# Patient Record
Sex: FEMALE | Race: WHITE | NOT HISPANIC OR LATINO | Employment: UNEMPLOYED | URBAN - METROPOLITAN AREA
[De-identification: names, ages, dates, MRNs, and addresses within clinical notes are randomized per-mention and may not be internally consistent; named-entity substitution may affect disease eponyms.]

---

## 2017-02-21 ENCOUNTER — OFFICE VISIT (OUTPATIENT)
Dept: NEUROLOGY | Facility: CLINIC | Age: 62
End: 2017-02-21
Payer: COMMERCIAL

## 2017-02-21 VITALS
BODY MASS INDEX: 52.44 KG/M2 | SYSTOLIC BLOOD PRESSURE: 138 MMHG | DIASTOLIC BLOOD PRESSURE: 72 MMHG | HEIGHT: 61 IN | WEIGHT: 277.75 LBS | HEART RATE: 76 BPM

## 2017-02-21 DIAGNOSIS — G25.81 RLS (RESTLESS LEGS SYNDROME): ICD-10-CM

## 2017-02-21 DIAGNOSIS — G62.9 NEUROPATHY: Primary | ICD-10-CM

## 2017-02-21 DIAGNOSIS — M79.7 FIBROMYALGIA: ICD-10-CM

## 2017-02-21 DIAGNOSIS — G31.84 MILD NEUROCOGNITIVE DISORDER: ICD-10-CM

## 2017-02-21 DIAGNOSIS — M51.9 LUMBAR DISC DISORDER: ICD-10-CM

## 2017-02-21 PROCEDURE — 99214 OFFICE O/P EST MOD 30 MIN: CPT | Mod: S$GLB,,, | Performed by: PSYCHIATRY & NEUROLOGY

## 2017-02-21 PROCEDURE — 1160F RVW MEDS BY RX/DR IN RCRD: CPT | Mod: S$GLB,,, | Performed by: PSYCHIATRY & NEUROLOGY

## 2017-02-21 PROCEDURE — 99999 PR PBB SHADOW E&M-EST. PATIENT-LVL III: CPT | Mod: PBBFAC,,, | Performed by: PSYCHIATRY & NEUROLOGY

## 2017-02-21 NOTE — MR AVS SNAPSHOT
[facility]  [address] [address]  Phone: [phone]  Fax: [phone]                  Pepper Dow   2017 2:45 PM   Office Visit    Description:  Female : 1955   Provider:  Henrique Wood MD   Department:  [facility]           Reason for Visit     Peripheral Neuropathy           Diagnoses this Visit        Comments    Neuropathy    -  Primary     Mild neurocognitive disorder         Lumbar disc disorder         Fibromyalgia         RLS (restless legs syndrome)                To Do List           Goals (5 Years of Data)     None      Follow-Up and Disposition     Return in about 6 months (around 2017).      [facility] On Call     [facility] On Call Nurse Care Line -  Assistance  Registered nurses in the [facility] On Call Center provide clinical advisement, health education, appointment booking, and other advisory services.  Call for this free service at [phone].             Medications           Message regarding Medications     Verify the changes and/or additions to your medication regime listed below are the same as discussed with your clinician today.  If any of these changes or additions are incorrect, please notify your healthcare provider.             Verify that the below list of medications is an accurate representation of the medications you are currently taking.  If none reported, the list may be blank. If incorrect, please contact your healthcare provider. Carry this list with you in case of emergency.           Current Medications     albuterol (ACCUNEB) 0.63 mg/3 mL Nebu Take 0.63 mg by nebulization every 6 (six) hours as needed.      allopurinol (ZYLOPRIM) 100 MG tablet Take 100 mg by mouth once daily.     aspirin (ECOTRIN) 81 MG EC tablet Take 81 mg by mouth once daily.      budesonide-formoterol 160-4.5 mcg (SYMBICORT) 160-4.5 mcg/actuation HFAA Inhale 2 puffs into the lungs every 12 (twelve) hours.     "bumetanide (BUMEX) 2 MG tablet Take 1 mg by mouth once daily.     cetirizine (ZYRTEC) 10 MG tablet Take 10 mg by mouth once daily.    cyclobenzaprine (FLEXERIL) 10 MG tablet Take 10 mg by mouth every evening.    dapagliflozin 5 mg Tab Take 5 mg by mouth once daily.    desonide (DESOWEN) 0.05 % cream Apply topically 2 (two) times daily.    gabapentin (NEURONTIN) 400 MG capsule Take 800 mg by mouth 3 (three) times daily.    hydrocodone-acetaminophen 5-325mg (NORCO) 5-325 mg per tablet Take 1 tablet by mouth every 6 (six) hours as needed for Pain.    hydrocortisone (WESTCORT) 0.2 % cream Apply topically 2 (two) times daily.    ketoconazole (NIZORAL) 2 % cream Apply topically 2 (two) times daily.    levmefolate-B6 phos-methyl-B12 (METANX) 3-35-2 mg Tab Take 1 tablet by mouth 2 (two) times daily.      levothyroxine (SYNTHROID) 100 MCG tablet Take 100 mcg by mouth once daily.    metformin (FORTAMET) 1,000 mg 24 hr tablet Take 1,000 mg by mouth 2 (two) times daily with meals.      montelukast (SINGULAIR) 10 mg tablet Take 10 mg by mouth every evening.    olmesartan (BENICAR) 20 MG tablet Take 20 mg by mouth once daily.     pramipexole (MIRAPEX) 0.125 MG tablet Take 2 po qhs    SITagliptan (JANUVIA) 100 MG Tab Take 100 mg by mouth once daily.    duloxetine (CYMBALTA) 30 MG capsule Take 1 capsule (30 mg total) by mouth once daily.           Clinical Reference Information           Your Vitals Were     BP Pulse Height Weight BMI    138/72 76 5' 1" (1.549 m) 126 kg (277 lb 12.5 oz) 52.49 kg/m2      Blood Pressure          Most Recent Value    BP  138/72      Allergies as of 2/21/2017     No Known Allergies      Immunizations Administered on Date of Encounter - 2/21/2017     None      Language Assistance Services     ATTENTION: Language assistance services are available, free of charge. Please call [phone].      ATENCIÓN: Si habla bethanie, tiene a weir disposición servicios gratuitos de asistencia lingüística. Llame al " [phone].     MAYDA Ý: N?u b?n nói Ti?ng Vi?t, có các d?ch v? h? tr? ngôn ng? mi?n phí dành cho b?n. G?i s? [phone].         [facility] complies with applicable Federal civil rights laws and does not discriminate on the basis of race, color, national origin, age, disability, or sex.

## 2017-02-21 NOTE — PROGRESS NOTES
HPI:  Pepper Dow is a 61 y.o. female  with DM,  lower back pain (focal) likely from  Annular tear at L5/S1 in addition to multiple level degenerative change of the L spine per 2012 MRI. 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 MRI brain 2015 unremarkable and no epileptiform discharges on EEG at that time.   Patient states she has been doing much better- walking better and her pain is much better controlled with PT. Cymbalta prescribed by pain management works well.  Her memory is improved as well. She did have mild neurocognitive disorder on memory testing  Mood disorder noted as well. She states her mood is ok- some financial stressors.   Tremor is very rare  RLS is variable as well/ overall well controlled.   Review of Systems   Constitutional: Negative for fever.   HENT: Negative for nosebleeds.    Eyes: Negative for double vision.   Respiratory: Negative for hemoptysis.    Cardiovascular: Negative for leg swelling.   Gastrointestinal: Negative for blood in stool.   Genitourinary: Negative for hematuria.   Musculoskeletal: Negative for falls.   Skin: Negative for rash.   Neurological: Negative for seizures.   Endo/Heme/Allergies: Does not bruise/bleed easily.   Psychiatric/Behavioral: The patient does not have insomnia.        Exam:  Gen Appearance, well developed/nourished in no apparent distress  CV: 2+ distal pulses with mild dependent edema or swelling  Neuro:  MS: Awake, alert, Sustains attention  Recent/remote memory intact, Language is full to spontaneous speech/comprehension. Fund of Knowledge is full  No longer with stuttering  CN: , PERRL, Extraoccular movements and visual fields are full. Normal facial sensation and strength, Hearing symmetric, Tongue and Palate are midline and strong. Shoulder Shrug  symmetric and strong.  She is fully awake and alter throughout the exam.   Motor: Normal bulk, tone reduced in the intrinsic hand and feet muscles, no abnormal movements at rest but there is mild tremor with outstretched hands. 5/5 strength bilateral upper/lower extremities with 2+ reflexes in the arms and 1+ in the knees, absent ankle jerks.   Sensory: symmetric to vibration and temp. Romberg negative  Cerebellar: Finger-nose,Rapid alternating movements intact  Gait: Normal stance, no ataxia, but arthralgic gait    2015 Labs: Mild TSh elevation was treated by PCP-- labs now followed by PCP  TSH normal       2015 MRI and CT brain: some atrophy     Assessment/Plan: Pepper Dow is a 61 y.o. female  with DM,  lower back pain (focal) likely from  Annular tear at L5/S1 in addition to multiple level degenerative change of the L spine per 2012 MRI. 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 MRI brain 2015 unremarkable and no epileptiform discharges on EEG at that time.     I recommend:   1. 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   2. Her neuropathy could be challenging her balance and gait--she is much better with PT prescribed by pain management and pain is improved with Cymbalta per pain management.   3. Check Neuropsychological testing: Mild neurocognitive disorder by 2016 Neuropsychological testing as well as anxiety depression (mood seems improved on Cymbalta). Reviewed the importance of good physical and mental activity for memory reviewed.    4. Also continue Metanx and Gabapentin for  neuropathy. She failed Lyrica prior. No longer on Elavil and neuropathic cream did not help. The importance of good DM control needed to prevent worsening neuropathy reviewed.  Continue Weight loss  needed again reviewed today  5. Patient has been diagnosed with fibromyalgia (instead of RA).   6. She will continue Mirapex for RLS-- She has also had a few years of tremor with possible family history of this--tremor seems most consistent with benign essential tremor and is not frequently bothersome  7. Lumbar pain improved after Lumbar pain    RTC in 6 months

## 2017-03-29 ENCOUNTER — TELEPHONE (OUTPATIENT)
Dept: NEUROLOGY | Facility: CLINIC | Age: 62
End: 2017-03-29

## 2017-03-29 NOTE — TELEPHONE ENCOUNTER
----- Message from Aurora Ramirez sent at 3/29/2017 11:45 AM CDT -----  Contact: self  Pepper Dow  MRN: [id number]  : 1955  PCP: Ricardo Springer  Home Phone      [phone]  Work Phone      Not on file.  Mobile          [phone]      MESSAGE: the patient states the pramipexole (MIRAPEX) 0.125 MG tablet is not helping with the RLS at this time. She currently takes 2 tablets at night and is requesting if the medication can be increased.  Phone:[phone]

## 2017-03-30 ENCOUNTER — HOSPITAL ENCOUNTER (OUTPATIENT)
Dept: RADIOLOGY | Facility: HOSPITAL | Age: 62
Discharge: HOME | End: 2017-03-30
Attending: NURSE PRACTITIONER
Payer: COMMERCIAL

## 2017-03-30 ENCOUNTER — OFFICE VISIT (OUTPATIENT)
Dept: NEUROLOGY | Facility: CLINIC | Age: 62
End: 2017-03-30
Payer: COMMERCIAL

## 2017-03-30 VITALS
BODY MASS INDEX: 53.77 KG/M2 | HEIGHT: 61 IN | RESPIRATION RATE: 18 BRPM | DIASTOLIC BLOOD PRESSURE: 70 MMHG | WEIGHT: 284.81 LBS | SYSTOLIC BLOOD PRESSURE: 132 MMHG | HEART RATE: 66 BPM

## 2017-03-30 DIAGNOSIS — R26.9 GAIT ABNORMALITY: ICD-10-CM

## 2017-03-30 DIAGNOSIS — M25.512 ACUTE PAIN OF LEFT SHOULDER: ICD-10-CM

## 2017-03-30 DIAGNOSIS — R42 DIZZINESS: ICD-10-CM

## 2017-03-30 DIAGNOSIS — R41.0 CONFUSION: ICD-10-CM

## 2017-03-30 DIAGNOSIS — R47.9 SPEECH DISTURBANCE, UNSPECIFIED TYPE: ICD-10-CM

## 2017-03-30 DIAGNOSIS — G25.81 RLS (RESTLESS LEGS SYNDROME): Primary | ICD-10-CM

## 2017-03-30 DIAGNOSIS — M54.2 NECK PAIN ON LEFT SIDE: ICD-10-CM

## 2017-03-30 PROCEDURE — 73030 X-RAY EXAM OF SHOULDER: CPT | Mod: TC,LT

## 2017-03-30 PROCEDURE — 72040 X-RAY EXAM NECK SPINE 2-3 VW: CPT | Mod: TC

## 2017-03-30 PROCEDURE — 72040 X-RAY EXAM NECK SPINE 2-3 VW: CPT | Mod: 26,,, | Performed by: RADIOLOGY

## 2017-03-30 PROCEDURE — 99999 PR PBB SHADOW E&M-EST. PATIENT-LVL V: CPT | Mod: PBBFAC,,, | Performed by: NURSE PRACTITIONER

## 2017-03-30 PROCEDURE — 99215 OFFICE O/P EST HI 40 MIN: CPT | Mod: S$GLB,,, | Performed by: NURSE PRACTITIONER

## 2017-03-30 PROCEDURE — 73030 X-RAY EXAM OF SHOULDER: CPT | Mod: 26,LT,, | Performed by: RADIOLOGY

## 2017-03-30 PROCEDURE — 1160F RVW MEDS BY RX/DR IN RCRD: CPT | Mod: S$GLB,,, | Performed by: NURSE PRACTITIONER

## 2017-03-30 RX ORDER — PRAMIPEXOLE DIHYDROCHLORIDE 0.75 MG/1
TABLET ORAL
Qty: 30 TABLET | Refills: 5 | Status: SHIPPED | OUTPATIENT
Start: 2017-03-30 | End: 2017-04-13 | Stop reason: SDUPTHER

## 2017-03-30 NOTE — MR AVS SNAPSHOT
[facility]  [address] [facility] [address]  Phone: [phone]  Fax: [phone]                  Pepper Dow   3/30/2017 2:20 PM   Office Visit    Description:  Female : 1955   Provider:  Mallory Ramirez NP   Department:  [facility]           Reason for Visit     Neurologic Problem     Headache           Diagnoses this Visit        Comments    RLS (restless legs syndrome)    -  Primary     Confusion         Speech disturbance, unspecified type         Acute pain of left shoulder         Neck pain on left side         Dizziness         Gait abnormality                To Do List           Future Appointments        Provider Department Dept Phone    3/30/2017 3:50 PM LAB, [facility] [phone]    3/30/2017 4:00 PM [facility] XR2 [facility] [phone]    2017 1:45 PM [facility] MRI1 [facility] [phone]    2017 10:30 AM Henrique Wood MD [facility] [phone]      Goals (5 Years of Data)     None      Follow-Up and Disposition     Return in about 2 weeks (around 2017).       These Medications        Disp Refills Start End    pramipexole (MIRAPEX) 0.75 MG tablet 30 tablet 5 3/30/2017     Take 2 po qhs    Pharmacy: [address] [address] [facility] [address] Ph #: [phone]         [facility]     [facility] Nurse Care Line -  Assistance  Unless otherwise directed by your provider, please contact [facility], our nurse care line that is available for  assistance.     Registered nurses in the [facility] Center provide: appointment scheduling, clinical advisement, health education, and other advisory services.  Call: [phone] (toll free)               Medications           Message regarding Medications     Verify the changes and/or additions to your medication regime listed below are the same  as discussed with your clinician today.  If any of these changes or additions are incorrect, please notify your healthcare provider.        CHANGE how you are taking these medications     Start Taking Instead of    pramipexole (MIRAPEX) 0.75 MG tablet pramipexole (MIRAPEX) 0.125 MG tablet    Dosage:  Take 2 po qhs Dosage:  Take 2 po qhs    Reason for Change:  Reorder            Verify that the below list of medications is an accurate representation of the medications you are currently taking.  If none reported, the list may be blank. If incorrect, please contact your healthcare provider. Carry this list with you in case of emergency.           Current Medications     albuterol (ACCUNEB) 0.63 mg/3 mL Nebu Take 0.63 mg by nebulization every 6 (six) hours as needed.      allopurinol (ZYLOPRIM) 100 MG tablet Take 100 mg by mouth once daily.     aspirin (ECOTRIN) 81 MG EC tablet Take 81 mg by mouth once daily.      budesonide-formoterol 160-4.5 mcg (SYMBICORT) 160-4.5 mcg/actuation HFAA Inhale 2 puffs into the lungs every 12 (twelve) hours.    bumetanide (BUMEX) 2 MG tablet Take 1 mg by mouth once daily.     cetirizine (ZYRTEC) 10 MG tablet Take 10 mg by mouth once daily.    cyclobenzaprine (FLEXERIL) 10 MG tablet Take 10 mg by mouth every evening.    dapagliflozin 5 mg Tab Take 5 mg by mouth once daily.    desonide (DESOWEN) 0.05 % cream Apply topically 2 (two) times daily.    duloxetine (CYMBALTA) 30 MG capsule Take 1 capsule (30 mg total) by mouth once daily.    gabapentin (NEURONTIN) 400 MG capsule Take 800 mg by mouth 3 (three) times daily.    hydrocodone-acetaminophen 5-325mg (NORCO) 5-325 mg per tablet Take 1 tablet by mouth every 6 (six) hours as needed for Pain.    hydrocortisone (WESTCORT) 0.2 % cream Apply topically 2 (two) times daily.    ketoconazole (NIZORAL) 2 % cream Apply topically 2 (two) times daily.    levmefolate-B6 phos-methyl-B12 (METANX) 3-35-2 mg Tab Take 1 tablet by mouth 2 (two) times daily.    "   levothyroxine (SYNTHROID) 100 MCG tablet Take 100 mcg by mouth once daily.    metformin (FORTAMET) 1,000 mg 24 hr tablet Take 1,000 mg by mouth 2 (two) times daily with meals.      montelukast (SINGULAIR) 10 mg tablet Take 10 mg by mouth every evening.    olmesartan (BENICAR) 20 MG tablet Take 20 mg by mouth once daily.     pramipexole (MIRAPEX) 0.75 MG tablet Take 2 po qhs    SITagliptan (JANUVIA) 100 MG Tab Take 100 mg by mouth once daily.           Clinical Reference Information           Your Vitals Were     BP Pulse Resp Height Weight BMI    132/70 (BP Location: Right arm, Patient Position: Sitting, BP Method: Manual) 66 18 5' 1" (1.549 m) 129.2 kg (284 lb 13.4 oz) 53.82 kg/m2      Blood Pressure          Most Recent Value    BP  132/70      Allergies as of 3/30/2017     No Known Allergies      Immunizations Administered on Date of Encounter - 3/30/2017     None      Orders Placed During Today's Visit      Normal Orders This Visit    WALKER FOR HOME USE     Future Labs/Procedures Expected by Expires    CBC auto differential  3/30/2017 5/29/2018    Comprehensive metabolic panel  3/30/2017 3/30/2018    Ferritin  3/30/2017 5/29/2018    IRON AND TIBC  3/30/2017 5/29/2018    MRI Brain W WO Contrast  3/30/2017 3/30/2018    TSH  3/30/2017 5/29/2018    Vitamin B12  3/30/2017 5/29/2018    X-Ray Cervical Spine AP And Lateral  3/30/2017 3/30/2018    X-Ray Shoulder 2 or More Views Left  3/30/2017 3/30/2018      Language Assistance Services     ATTENTION: Language assistance services are available, free of charge. Please call [phone].      ATENCIÓN: Si habla español, tiene a weir disposición servicios gratuitos de asistencia lingüística. Llame al [phone].     CHÚ Ý: N?u b?n nói Ti?ng Vi?t, có các d?ch v? h? tr? ngôn ng? mi?n phí dành cho b?n. G?i s? [phone].         [facility] complies with applicable Federal civil rights laws and does not discriminate on the basis of race, color, " national origin, age, disability, or sex.

## 2017-03-30 NOTE — TELEPHONE ENCOUNTER
appt made with Mallory Ramirez NP to discuss symptoms and treatment options as recommended by Dr Wood. appt date and time given to pt. Pt voiced understanding.

## 2017-03-30 NOTE — PROGRESS NOTES
"HPI:  Pepper Dow is a 61 y.o. female with a history of DM, NATY (compliant with CPAP) lower back pain,  Annular tear at L5/S1, in addition to multiple level degenerative change of the L-spine per 2012 MRI. 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 She previously had reported spells of SOB, confusion, and dizziness, with documented O2 reduction, with normal MRI Brain and EEG. Neuropsych testing suggests mild neurocognitive disorder.     She presents today with complaint of increased RLS at night, which is not being managed by her current dose of Mirapex.    She also reports to having disorientation, confusion, dizziness, and speech issues, such as slurring and stuttering, which began two weeks ago. She also admits to having a "pressure" in her head. She states that she had similar symptoms in May 2015, and that no cause was found on her admission for this. Her daughter states that she had difficulty finding which word she wants to say, and that she has observed some gait instability and some near falls.     She complains of "heaviness" to her left shoulder, which also recently began.       Review of Systems   Constitutional: Positive for malaise/fatigue. Negative for fever.   HENT: Negative for nosebleeds.    Eyes: Negative for double vision.   Respiratory: Positive for shortness of breath. Negative for hemoptysis.    Cardiovascular: Negative for leg swelling.   Gastrointestinal: Negative for blood in stool.   Genitourinary: Negative for hematuria.   Musculoskeletal: Positive for joint pain. Negative for falls.        Left shoulder and left neck pain   Skin: Negative for rash.   Neurological: Positive for dizziness, sensory change and weakness. Negative for tingling, focal weakness and seizures.   Endo/Heme/Allergies: Does not bruise/bleed easily.   Psychiatric/Behavioral: Positive for memory loss. Negative for depression. The " patient is not nervous/anxious and does not have insomnia.      Exam:  Gen Appearance: Obese; does appear to have some mild SOB at appointment today with a pulse ox of 93%  CV: 2+ distal pulses with mild dependent edema or swelling  Neuro:  MS: Awake, alert, Sustains attention  Recent/remote memory intact, Language is full to spontaneous speech/comprehension. Fund of Knowledge is full  No longer with stuttering  CN: , PERRL, Extraoccular movements and visual fields are full. Normal facial sensation and strength, Hearing symmetric, Tongue and Palate are midline and strong. Shoulder Shrug symmetric and strong.  She is fully awake and alter throughout the exam.   Motor: Normal bulk, tone reduced in the intrinsic hand and feet muscles, no abnormal movements at rest but there is mild tremor with outstretched hands. 5/5 strength bilateral upper/lower extremities with 2+ reflexes in the arms and 1+ in the knees, absent ankle jerks; no pronator drift on exam today.   Sensory: symmetric to vibration and temp. Romberg positive.  Cerebellar: Finger-nose, Rapid alternating movements intact  Gait: arthralgic gait, gait is slower today    2015 Labs: Mild TSH elevation was treated by PCP-- labs now followed by PCP  TSH normal     2015 MRI and CT brain: some atrophy     Assessment/Plan: Pepper Dow is a 61 y.o. female with a history of DM, fibromyalgia, lower back pain,  Annular tear at L5/S1, in addition to multiple level degenerative change of the L-spine per 2012 MRI. 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 She previously had reported spells of SOB, confusion, and dizziness, with documented O2 reduction, with normal MRI Brain and EEG. Neuropsych testing suggests mild neurocognitive disorder.     I recommend:   1. MRI Brain with and without contrast to assess for any areas of ischemia, given her complaints of speech difficulty, word  "finding issues, confusion, gait imbalance.   2. Check left shoulder X-rays and C-spine X-rays to assess for structural abnormalities, given her complaints of left shoulder pain and "heaviness".  3. CBC, CMP, TSH, B12, and Iron studies to assess for systemic cause of her complaints.   4. Increase Mirapex to 0.75 mg qhs for better control of her RLS.  5. Refer back to Dr. Springer for worsening SOB. She has an appointment next week. 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  6. Prescribed walker for home use. Advised on fall precautions.   7. If her workup is unrevealing for cause of her worsening gait imbalance, I will repeat her EMG BLE and refer to PT for gait/balance therapy.   8. Continue Metanx and Gabapentin for neuropathy. She failed Lyrica prior. No longer on Elavil, and neuropathic cream did not help. The importance of good DM control needed to prevent worsening neuropathy reviewed.      Follow up in 2 weeks.       "

## 2017-04-05 ENCOUNTER — HOSPITAL ENCOUNTER (OUTPATIENT)
Dept: RADIOLOGY | Facility: HOSPITAL | Age: 62
Discharge: HOME | End: 2017-04-05
Attending: NURSE PRACTITIONER
Payer: COMMERCIAL

## 2017-04-05 DIAGNOSIS — R42 DIZZINESS: ICD-10-CM

## 2017-04-05 DIAGNOSIS — R41.0 CONFUSION: ICD-10-CM

## 2017-04-05 DIAGNOSIS — R47.9 SPEECH DISTURBANCE, UNSPECIFIED TYPE: ICD-10-CM

## 2017-04-05 PROCEDURE — 25500020 PHARM REV CODE 255: Performed by: NURSE PRACTITIONER

## 2017-04-05 PROCEDURE — 70553 MRI BRAIN STEM W/O & W/DYE: CPT | Mod: TC

## 2017-04-05 PROCEDURE — 70553 MRI BRAIN STEM W/O & W/DYE: CPT | Mod: 26,,, | Performed by: RADIOLOGY

## 2017-04-05 PROCEDURE — A9585 GADOBUTROL INJECTION: HCPCS | Performed by: NURSE PRACTITIONER

## 2017-04-05 RX ORDER — GADOBUTROL 604.72 MG/ML
10 INJECTION INTRAVENOUS
Status: COMPLETED | OUTPATIENT
Start: 2017-04-05 | End: 2017-04-05

## 2017-04-05 RX ADMIN — GADOBUTROL 10 ML: 604.72 INJECTION INTRAVENOUS at 02:04

## 2017-04-13 ENCOUNTER — OFFICE VISIT (OUTPATIENT)
Dept: NEUROLOGY | Facility: CLINIC | Age: 62
End: 2017-04-13
Payer: COMMERCIAL

## 2017-04-13 VITALS
HEART RATE: 64 BPM | RESPIRATION RATE: 18 BRPM | BODY MASS INDEX: 52.95 KG/M2 | SYSTOLIC BLOOD PRESSURE: 130 MMHG | HEIGHT: 61 IN | WEIGHT: 280.44 LBS | DIASTOLIC BLOOD PRESSURE: 68 MMHG

## 2017-04-13 DIAGNOSIS — G62.9 NEUROPATHY: ICD-10-CM

## 2017-04-13 DIAGNOSIS — G25.81 RLS (RESTLESS LEGS SYNDROME): ICD-10-CM

## 2017-04-13 DIAGNOSIS — R42 DIZZINESS: Primary | ICD-10-CM

## 2017-04-13 PROBLEM — M54.2 NECK PAIN ON LEFT SIDE: Status: RESOLVED | Noted: 2017-03-30 | Resolved: 2017-04-13

## 2017-04-13 PROBLEM — M25.512 LEFT SHOULDER PAIN: Status: RESOLVED | Noted: 2017-03-30 | Resolved: 2017-04-13

## 2017-04-13 PROBLEM — R41.0 CONFUSION: Status: RESOLVED | Noted: 2017-03-30 | Resolved: 2017-04-13

## 2017-04-13 PROBLEM — R47.9 SPEECH DISTURBANCE: Status: RESOLVED | Noted: 2017-03-30 | Resolved: 2017-04-13

## 2017-04-13 PROCEDURE — 99214 OFFICE O/P EST MOD 30 MIN: CPT | Mod: S$GLB,,, | Performed by: NURSE PRACTITIONER

## 2017-04-13 PROCEDURE — 1160F RVW MEDS BY RX/DR IN RCRD: CPT | Mod: S$GLB,,, | Performed by: NURSE PRACTITIONER

## 2017-04-13 PROCEDURE — 99999 PR PBB SHADOW E&M-EST. PATIENT-LVL IV: CPT | Mod: PBBFAC,,, | Performed by: NURSE PRACTITIONER

## 2017-04-13 RX ORDER — PRAMIPEXOLE DIHYDROCHLORIDE 0.75 MG/1
TABLET ORAL
Qty: 60 TABLET | Refills: 5 | Status: SHIPPED | OUTPATIENT
Start: 2017-04-13 | End: 2017-11-14 | Stop reason: SDUPTHER

## 2017-04-13 NOTE — MR AVS SNAPSHOT
[facility]  [address] [facility] [address]  Phone: [phone]  Fax: [phone]                  Pepper Dow   2017 2:20 PM   Office Visit    Description:  Female : 1955   Provider:  Mallory Ramirez NP   Department:  [facility]           Reason for Visit     Neurologic Problem           Diagnoses this Visit        Comments    Neuropathy    -  Primary     RLS (restless legs syndrome)         Dizziness                To Do List           Future Appointments        Provider Department Dept Phone    2017 10:30 AM Henrique Wood MD [facility] [phone]      Goals (5 Years of Data)     None       These Medications        Disp Refills Start End    pramipexole (MIRAPEX) 0.75 MG tablet 60 tablet 5 2017     Take 2 po qhs    Pharmacy: [facility] [address] [place] [id number] [facility] [address] Ph #: [phone]         [facility] On [facility]     [facility] On [facility] Nurse Care Line -  Assistance  Unless otherwise directed by your provider, please contact [facility], our nurse care line that is available for  assistance.     Registered nurses in the [facility] On Call Center provide: appointment scheduling, clinical advisement, health education, and other advisory services.  Call: [phone] (toll free)               Medications           Message regarding Medications     Verify the changes and/or additions to your medication regime listed below are the same as discussed with your clinician today.  If any of these changes or additions are incorrect, please notify your healthcare provider.             Verify that the below list of medications is an accurate representation of the medications you are currently taking.  If none reported, the list may be blank. If incorrect, please contact your healthcare provider. Carry this list with you in case of emergency.           Current Medications     albuterol (ACCUNEB)  "0.63 mg/3 mL Nebu Take 0.63 mg by nebulization every 6 (six) hours as needed.      allopurinol (ZYLOPRIM) 100 MG tablet Take 100 mg by mouth once daily.     aspirin (ECOTRIN) 81 MG EC tablet Take 81 mg by mouth once daily.      budesonide-formoterol 160-4.5 mcg (SYMBICORT) 160-4.5 mcg/actuation HFAA Inhale 2 puffs into the lungs every 12 (twelve) hours.    bumetanide (BUMEX) 2 MG tablet Take 1 mg by mouth once daily.     cetirizine (ZYRTEC) 10 MG tablet Take 10 mg by mouth once daily.    cyclobenzaprine (FLEXERIL) 10 MG tablet Take 10 mg by mouth every evening.    dapagliflozin 5 mg Tab Take 5 mg by mouth once daily.    desonide (DESOWEN) 0.05 % cream Apply topically 2 (two) times daily.    duloxetine (CYMBALTA) 30 MG capsule Take 1 capsule (30 mg total) by mouth once daily.    gabapentin (NEURONTIN) 400 MG capsule Take 800 mg by mouth 3 (three) times daily.    hydrocodone-acetaminophen 5-325mg (NORCO) 5-325 mg per tablet Take 1 tablet by mouth every 6 (six) hours as needed for Pain.    hydrocortisone (WESTCORT) 0.2 % cream Apply topically 2 (two) times daily.    ketoconazole (NIZORAL) 2 % cream Apply topically 2 (two) times daily.    levmefolate-B6 phos-methyl-B12 (METANX) 3-35-2 mg Tab Take 1 tablet by mouth 2 (two) times daily.      levothyroxine (SYNTHROID) 100 MCG tablet Take 100 mcg by mouth once daily.    metformin (FORTAMET) 1,000 mg 24 hr tablet Take 1,000 mg by mouth 2 (two) times daily with meals.      montelukast (SINGULAIR) 10 mg tablet Take 10 mg by mouth every evening.    olmesartan (BENICAR) 20 MG tablet Take 20 mg by mouth once daily.     pramipexole (MIRAPEX) 0.75 MG tablet Take 2 po qhs    SITagliptan (JANUVIA) 100 MG Tab Take 100 mg by mouth once daily.           Clinical Reference Information           Your Vitals Were     BP Pulse Resp Height Weight BMI    130/68 (BP Location: Left arm, Patient Position: Sitting, BP Method: Manual) 64 18 5' 1" (1.549 m) 127.2 kg (280 lb 6.8 oz) 52.99 kg/m2    "   Blood Pressure          Most Recent Value    BP  130/68      Allergies as of 4/13/2017     No Known Allergies      Immunizations Administered on Date of Encounter - 4/13/2017     None      Orders Placed During Today's Visit      Normal Orders This Visit    Ambulatory consult to Physical Therapy     WALKER FOR HOME USE       Language Assistance Services     ATTENTION: Language assistance services are available, free of charge. Please call [phone].      ATENCIÓN: Si habla español, tiene a weir disposición servicios gratuitos de asistencia lingüística. Llame al [phone].     CHÚ Ý: N?u b?n nói Ti?ng Vi?t, có các d?ch v? h? tr? ngôn ng? mi?n phí dành cho b?n. G?i s? [phone].         [facility] complies with applicable Federal civil rights laws and does not discriminate on the basis of race, color, national origin, age, disability, or sex.

## 2017-04-13 NOTE — PROGRESS NOTES
"HPI:  Pepper Dow is a 61 y.o. female with a history of DM, NATY (compliant with CPAP) lower back pain,  Annular tear at L5/S1, in addition to multiple level degenerative change of the L-spine per 2012 MRI. 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 She previously had reported spells of SOB, confusion, and dizziness, with documented O2 reduction, with normal MRI Brain and EEG. Neuropsych testing suggests mild neurocognitive disorder.     She completed an MRI Brain, left shoulder X-rays, and C-spine X-rays since her last visit, as well as lab work for evaluation of confusion, word finding issues, gait imbalance, near falls, head pressure, and dizziness. She was referred back to her PCP for elevated TIBC and Ferritin, hyponatremia, and elevated transaminases. She was also referred back to her PCP for SOB.     She is no longer stuttering and slurring to her speech and her near falls have resolved, but continues with confusion. She continues with pressure to her head, which is located to the frontal area, which occurs daily, and lasts for a few hours at a time. She does not consider this to be a headache, but more of a pressure. She does experience dizziness with the pressure to her head, which also affects her ability to focus.     Her Mirapex was increased at her last visit, which was helpful for her RLS.     She denies any further complaint of "heaviness" to her left shoulder.     Review of Systems   Constitutional: Negative for fever and malaise/fatigue.   HENT: Negative for nosebleeds.    Eyes: Negative for double vision.   Respiratory: Negative for hemoptysis and shortness of breath.    Cardiovascular: Negative for leg swelling.   Gastrointestinal: Negative for blood in stool.   Genitourinary: Negative for hematuria.   Musculoskeletal: Negative for falls and joint pain.        Left shoulder and left neck pain   Skin: Negative " for rash.   Neurological: Positive for dizziness. Negative for tingling, sensory change, focal weakness, seizures and weakness.   Endo/Heme/Allergies: Does not bruise/bleed easily.   Psychiatric/Behavioral: Positive for memory loss. Negative for depression. The patient is not nervous/anxious and does not have insomnia.      Exam:  Gen Appearance: Obese; does appear to have some mild SOB at appointment today with a pulse ox of 93%  CV: 2+ distal pulses with mild dependent edema or swelling  Neuro:  MS: Awake, alert, Sustains attention  Recent/remote memory intact, Language is full to spontaneous speech/comprehension. Fund of Knowledge is full  No longer with stuttering  CN: , PERRL, Extraoccular movements and visual fields are full. Normal facial sensation and strength, Hearing symmetric, Tongue and Palate are midline and strong. Shoulder Shrug symmetric and strong.  She is fully awake and alter throughout the exam.   Motor: Normal bulk, tone reduced in the intrinsic hand and feet muscles, no abnormal movements at rest but there is mild tremor with outstretched hands. 5/5 strength bilateral upper/lower extremities with 2+ reflexes in the arms and 1+ in the knees, absent ankle jerks; no pronator drift on exam today.   Sensory: symmetric to vibration and temp. Romberg positive.  Cerebellar: Finger-nose, Rapid alternating movements intact  Gait: arthralgic gait, gait is slower today    2015 Labs: Mild TSH elevation was treated by PCP-- labs now followed by PCP  TSH normal     3/30/2017 C-spine X-rays:   Cervical spine, AP and lateral: There straightening of the normal cervical lordosis.  Vertebral body heights are maintained.  There are multilevel degenerative changes consisting of disc space narrowing, endplate sclerosis, marginal osteophytosis and facet arthropathy.  The prevertebral soft tissues are within normal limits.  No fracture or subluxation is seen.    3/30/2017 Left Shoulder X-rays:  Left shoulder, 2 views:  There are mild degenerative changes of the acromioclavicular joint.  The glenohumeral joint space is intact.  No fracture or dislocation is seen.\    2015 MRI and CT brain: some atrophy     4/5/2017 MRI Brain: Age-appropriate generalized volume loss with scattered foci of T2/FLAIR signal abnormality within the supratentorial white matter  while nonspecific suggestive for mild degree of degree of chronic microvascular ischemic change.  No evidence for acute infarction or enhancing lesion.  Empty sella turcica configuration    Labs: 4/2017 CBC, CMP, TSH, B12, and Iron Studies reviewed; elevated TIBC and Ferritin, hyponatremia, and elevated transaminases noted.     Assessment/Plan: Pepper Dow is a 61 y.o. female with a history of DM, fibromyalgia, lower back pain,  Annular tear at L5/S1, in addition to multiple level degenerative change of the L-spine per 2012 MRI. 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 She previously had reported spells of SOB, confusion, and dizziness, with documented O2 reduction, with normal MRI Brain and EEG. Neuropsych testing suggests mild neurocognitive disorder.     I recommend:   1. Refer for PT for dizziness and gait imbalance. Many of her complaints could have been related to her hyponatremia. She appears to have vertiginous complaints, which I believe would benefit from PT.   2. Continue Mirapex for 0.75 mg qhs for RLS.   3. Prescribed rolling walker for home use again. Reiterated fall precautions.   4. Continue Metanx and Gabapentin for neuropathy. She failed Lyrica prior. No longer on Elavil, and neuropathic cream did not help. The importance of good DM control needed to prevent worsening neuropathy reviewed.      Follow up as scheduled with Dr. Wood in August.

## 2017-07-27 ENCOUNTER — HOSPITAL ENCOUNTER (OUTPATIENT)
Dept: RADIOLOGY | Facility: HOSPITAL | Age: 62
Discharge: HOME | End: 2017-07-27
Attending: ORTHOPAEDIC SURGERY
Payer: COMMERCIAL

## 2017-07-27 DIAGNOSIS — M25.562 KNEE PAIN, BILATERAL: ICD-10-CM

## 2017-07-27 DIAGNOSIS — M25.561 KNEE PAIN, BILATERAL: ICD-10-CM

## 2017-07-27 PROCEDURE — 73564 X-RAY EXAM KNEE 4 OR MORE: CPT | Mod: 50,TC

## 2017-08-25 ENCOUNTER — OFFICE VISIT (OUTPATIENT)
Dept: NEUROLOGY | Facility: CLINIC | Age: 62
End: 2017-08-25
Payer: COMMERCIAL

## 2017-08-25 VITALS
DIASTOLIC BLOOD PRESSURE: 72 MMHG | BODY MASS INDEX: 50.58 KG/M2 | SYSTOLIC BLOOD PRESSURE: 122 MMHG | HEIGHT: 61 IN | HEART RATE: 64 BPM | WEIGHT: 267.88 LBS | RESPIRATION RATE: 18 BRPM

## 2017-08-25 DIAGNOSIS — R51.9 HEADACHE, CHRONIC DAILY: ICD-10-CM

## 2017-08-25 DIAGNOSIS — G62.9 NEUROPATHY: Primary | ICD-10-CM

## 2017-08-25 DIAGNOSIS — G31.84 MILD NEUROCOGNITIVE DISORDER: ICD-10-CM

## 2017-08-25 DIAGNOSIS — G25.81 RLS (RESTLESS LEGS SYNDROME): ICD-10-CM

## 2017-08-25 PROCEDURE — 99999 PR PBB SHADOW E&M-EST. PATIENT-LVL III: CPT | Mod: PBBFAC,,, | Performed by: PSYCHIATRY & NEUROLOGY

## 2017-08-25 PROCEDURE — 99214 OFFICE O/P EST MOD 30 MIN: CPT | Mod: S$GLB,,, | Performed by: PSYCHIATRY & NEUROLOGY

## 2017-08-25 PROCEDURE — 3008F BODY MASS INDEX DOCD: CPT | Mod: S$GLB,,, | Performed by: PSYCHIATRY & NEUROLOGY

## 2017-08-25 RX ORDER — NORTRIPTYLINE HYDROCHLORIDE 10 MG/1
CAPSULE ORAL
Qty: 60 CAPSULE | Refills: 11 | Status: SHIPPED | OUTPATIENT
Start: 2017-08-25 | End: 2018-03-09

## 2017-08-25 RX ORDER — BUMETANIDE 1 MG/1
1 TABLET ORAL DAILY
COMMUNITY

## 2017-08-25 NOTE — PROGRESS NOTES
HPI:  Pepper Dow is a 62 y.o. female  with DM,  lower back pain (focal) likely from  Annular tear at L5/S1 in addition to multiple level degenerative change of the L spine per 2012 MRI. 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 MRI brain 2015 unremarkable and no epileptiform discharges on EEG at that time.     Since the last visit, patient called stating RLS symptoms were not well controlled. She saw NP Mallory in Neurology for this  At the visit, the patient complained of speech difficulty, word finding issues, confusion, gait imbalance and MRI brain was ordered as were xrays given multiple somatic complaints  Labs showed low sodium, elevated liver enzymes, and elevated TIBC /transferrin and reduction of Ferritin and patient was referred back to PCP for treatment.   Increased Mirapex to 0.75 mg qhs  Per NP  She was referred to PT for poor balance and vertigo symptoms- she did not attend due to cost    Today she complains of headache across the bilateral frontal region which onset over 6 months ago- states it occurred with the other symptoms she complained of this year.She does not take anything for the pain  She states her balance is improved now- often can tolerate without her walker.  Her gabapentin is dosed at 800mg TID which helps along the Cymbalta for her numb feet. RLS is better controlled with higher dose mirapex.   Headache is episodic and occurs daily and last for a few hours and is severe. A few days a week she only takes Tylenol for pain  Sleep is poor.   Patient is not being bothered by tremor which seems improved.   Memory is about the same  She states she is not on pamelor or elavil currently  Had a slip and fall and podiatry is treating toe fracture from this  Review of Systems   Constitutional: Negative for  fever.   HENT: Negative for nosebleeds.    Eyes: Negative for double vision.   Respiratory: Negative for hemoptysis.    Cardiovascular: Negative for leg swelling.   Gastrointestinal: Negative for blood in stool.   Genitourinary: Negative for hematuria.   Musculoskeletal: Negative for falls.   Skin: Negative for rash.   Neurological: Negative for focal weakness.   Endo/Heme/Allergies: Does not bruise/bleed easily.   Psychiatric/Behavioral: The patient has insomnia.        Exam:  Gen Appearance, well developed/nourished in no apparent distress  CV: 2+ distal pulses with mild dependent edema or swelling  Neuro:  MS: Awake, alert, Sustains attention  Recent/remote memory intact, Language is full to spontaneous speech/comprehension. Fund of Knowledge is full  No longer with stuttering  CN: , PERRL, Extraoccular movements and visual fields are full. Normal facial sensation and strength, Hearing symmetric, Tongue and Palate are midline and strong. Shoulder Shrug symmetric and strong.  Motor: Normal bulk, tone reduced in the intrinsic hand and feet muscles, no abnormal movements at rest but there is mild tremor with outstretched hands. 5/5 strength bilateral upper/lower extremities with 2+ reflexes in the arms and 1+ in the knees, absent ankle jerks.   Sensory: symmetric to vibration and temp. Romberg negative  Cerebellar: Finger-nose,Rapid alternating movements intact  Gait: Normal stance, no ataxia, but arthralgic gait and some atasia abasia- advised constant walker use    2017 Labs: Labs showed low sodium (130), elevated liver enzymes, and elevated TIBC /transferrin and reduction of Ferritin.   Review of Systems       4/2017 MRI Brain: Age-appropriate generalized volume loss with scattered foci of T2/FLAIR signal abnormality within the supratentorial white matter  while nonspecific suggestive for mild degree of degree of  chronic microvascular ischemic change.    No evidence for acute infarction or enhancing  lesion.    Empty sella turcica configuration.    3/2017 Degenerative changes in the shoulder and neck by xray    Assessment/Plan: Pepper Dow is a 62 y.o. female  with DM,  lower back pain (focal) likely from  Annular tear at L5/S1 in addition to multiple level degenerative change of the L spine per 2012 MRI. 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 MRI brain 2015 unremarkable and no epileptiform discharges on EEG at that time.   2016 Neuropsych testing suggests mild neurocognitive disorder. Repeat MRI brain 2017 unremarkable  I recommend:   1. She was sent to PCP for treatment of hyponatremia and abnormal iron studies which all likely could worsen her RLS but RLS is improved  2. Her neuropathy could be challenging her balance and gait--continue PT PRN prescribed by pain management and pain and balanced are improved with Cymbalta as well.  She also has some long standing vertigo complaints at times  3. Mild neurocognitive disorder by 2016 Neuropsychological testing as well as anxiety depression (mood seems improved on Cymbalta). Reviewed the importance of good physical and mental activity for memory reviewed for MCI.    4. Also continue Metanx and Gabapentin for  neuropathy. She failed Lyrica prior. No longer on Elavil and neuropathic cream did not help. The importance of good DM control needed to prevent worsening neuropathy reviewed.    5. Patient has been diagnosed with fibromyalgia (instead of RA).   6. She will continue Mirapex for RLS which is better controlled-- She has also had a few years of tremor with possible family history of this--tremor seems most consistent with benign essential tremor and is not frequently bothersome  7. Add Pamelor per orders unless sedation or mood  changes for insomnia and headaches prevention. Headaches are long standing (predate last MRI). Watch for symptoms of serotonin excess as reviewed- low risk.   8. Note her multiple somatic complaints and her gait exam as above.   RTC in 6 months

## 2017-08-31 ENCOUNTER — TELEPHONE (OUTPATIENT)
Dept: NEUROLOGY | Facility: CLINIC | Age: 62
End: 2017-08-31

## 2017-08-31 NOTE — TELEPHONE ENCOUNTER
----- Message from Aurora Ramirez sent at 2017 10:49 AM CDT -----  Contact: self  Pepper Dow  MRN: [id number]  : 1955  PCP: Ricardo Springer  Home Phone      [phone]  Work Phone      Not on file.  Mobile          [phone]      MESSAGE: The patient states she started the nortriptyline (PAMELOR) 10 MG capsule. She states she got one good night of sleep on the medication and had not helped since. She states the headaches are much worse and she is been feeling disoriented and dizzy. She is concerned if she could just stop taking the medication. Please advise.  Phone:[phone]

## 2017-11-14 DIAGNOSIS — G25.81 RLS (RESTLESS LEGS SYNDROME): ICD-10-CM

## 2017-11-14 RX ORDER — PRAMIPEXOLE DIHYDROCHLORIDE 0.75 MG/1
TABLET ORAL
Qty: 60 TABLET | Refills: 5 | Status: SHIPPED | OUTPATIENT
Start: 2017-11-14 | End: 2017-11-15 | Stop reason: SDUPTHER

## 2017-11-16 RX ORDER — PRAMIPEXOLE DIHYDROCHLORIDE 0.75 MG/1
1.5 TABLET ORAL NIGHTLY
Qty: 180 TABLET | Refills: 4 | Status: SHIPPED | OUTPATIENT
Start: 2017-11-16

## 2018-03-09 ENCOUNTER — LAB VISIT (OUTPATIENT)
Dept: LAB | Facility: HOSPITAL | Age: 63
End: 2018-03-09
Attending: PSYCHIATRY & NEUROLOGY
Payer: COMMERCIAL

## 2018-03-09 ENCOUNTER — OFFICE VISIT (OUTPATIENT)
Dept: NEUROLOGY | Facility: CLINIC | Age: 63
End: 2018-03-09
Payer: COMMERCIAL

## 2018-03-09 VITALS
WEIGHT: 268.5 LBS | HEIGHT: 61 IN | BODY MASS INDEX: 50.69 KG/M2 | HEART RATE: 68 BPM | RESPIRATION RATE: 18 BRPM | DIASTOLIC BLOOD PRESSURE: 78 MMHG | SYSTOLIC BLOOD PRESSURE: 142 MMHG

## 2018-03-09 DIAGNOSIS — R68.89 SPELLS OF DECREASED ATTENTIVENESS: ICD-10-CM

## 2018-03-09 DIAGNOSIS — G62.9 NEUROPATHY: ICD-10-CM

## 2018-03-09 DIAGNOSIS — G31.84 MILD NEUROCOGNITIVE DISORDER: ICD-10-CM

## 2018-03-09 DIAGNOSIS — R68.89 SPELLS OF DECREASED ATTENTIVENESS: Primary | ICD-10-CM

## 2018-03-09 DIAGNOSIS — G25.81 RLS (RESTLESS LEGS SYNDROME): ICD-10-CM

## 2018-03-09 LAB
ALBUMIN SERPL BCP-MCNC: 3.8 G/DL
ALP SERPL-CCNC: 115 U/L
ALT SERPL W/O P-5'-P-CCNC: 66 U/L
ANION GAP SERPL CALC-SCNC: 13 MMOL/L
AST SERPL-CCNC: 61 U/L
BASOPHILS # BLD AUTO: 0.06 K/UL
BASOPHILS NFR BLD: 0.6 %
BILIRUB SERPL-MCNC: 0.6 MG/DL
BUN SERPL-MCNC: 28 MG/DL
CALCIUM SERPL-MCNC: 9.7 MG/DL
CHLORIDE SERPL-SCNC: 97 MMOL/L
CO2 SERPL-SCNC: 28 MMOL/L
CREAT SERPL-MCNC: 1.2 MG/DL
DIFFERENTIAL METHOD: ABNORMAL
EOSINOPHIL # BLD AUTO: 0.9 K/UL
EOSINOPHIL NFR BLD: 9.5 %
ERYTHROCYTE [DISTWIDTH] IN BLOOD BY AUTOMATED COUNT: 14.7 %
EST. GFR  (AFRICAN AMERICAN): 56 ML/MIN/1.73 M^2
EST. GFR  (NON AFRICAN AMERICAN): 49 ML/MIN/1.73 M^2
GLUCOSE SERPL-MCNC: 214 MG/DL
HCT VFR BLD AUTO: 42.3 %
HGB BLD-MCNC: 13.8 G/DL
LYMPHOCYTES # BLD AUTO: 2.3 K/UL
LYMPHOCYTES NFR BLD: 23.8 %
MCH RBC QN AUTO: 29.7 PG
MCHC RBC AUTO-ENTMCNC: 32.6 G/DL
MCV RBC AUTO: 91 FL
MONOCYTES # BLD AUTO: 0.7 K/UL
MONOCYTES NFR BLD: 6.9 %
NEUTROPHILS # BLD AUTO: 5.8 K/UL
NEUTROPHILS NFR BLD: 59.2 %
PLATELET # BLD AUTO: 229 K/UL
PMV BLD AUTO: 9.1 FL
POTASSIUM SERPL-SCNC: 4.3 MMOL/L
PROT SERPL-MCNC: 8.1 G/DL
RBC # BLD AUTO: 4.64 M/UL
SODIUM SERPL-SCNC: 138 MMOL/L
TSH SERPL DL<=0.005 MIU/L-ACNC: 1.11 UIU/ML
WBC # BLD AUTO: 9.78 K/UL

## 2018-03-09 PROCEDURE — 85025 COMPLETE CBC W/AUTO DIFF WBC: CPT

## 2018-03-09 PROCEDURE — 80053 COMPREHEN METABOLIC PANEL: CPT

## 2018-03-09 PROCEDURE — 99214 OFFICE O/P EST MOD 30 MIN: CPT | Mod: S$GLB,,, | Performed by: PSYCHIATRY & NEUROLOGY

## 2018-03-09 PROCEDURE — 82607 VITAMIN B-12: CPT

## 2018-03-09 PROCEDURE — 84443 ASSAY THYROID STIM HORMONE: CPT

## 2018-03-09 PROCEDURE — 36415 COLL VENOUS BLD VENIPUNCTURE: CPT

## 2018-03-09 PROCEDURE — 99999 PR PBB SHADOW E&M-EST. PATIENT-LVL III: CPT | Mod: PBBFAC,,, | Performed by: PSYCHIATRY & NEUROLOGY

## 2018-03-09 RX ORDER — OLMESARTAN MEDOXOMIL 5 MG/1
5 TABLET ORAL 2 TIMES DAILY
COMMUNITY
End: 2018-12-18

## 2018-03-09 RX ORDER — LOVASTATIN 20 MG/1
20 TABLET ORAL NIGHTLY
COMMUNITY

## 2018-03-09 NOTE — PROGRESS NOTES
HPI:  Pepper Dow is a 62 y.o. female  with DM,  lower back pain (focal) likely from  Annular tear at L5/S1 in addition to multiple level degenerative change of the L spine per 2012 MRI. 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 MRI brain 2015 unremarkable and no epileptiform discharges on EEG at that time.   2016 Neuropsych testing suggests mild neurocognitive disorder. Repeat MRI brain 2017 unremarkable    Since the last visit, patient felt confused and dizzy on pamelor so it was stopped  She is pending and eye doc appointment for blurred vision  She states she continues to have symptoms lasting 3-4 days of difficulty with walking, feeling she will fall backwards or side ways, blurred vision, and confusion. She reports the confusion as difficulty with orientation and feeling like her words don't come. This will go away for a few days. This can occur with rest or exertion. She feels tremulous when this occurs but no obvious seizures  She has headaches which occur with the symptoms at time chronically.    of over 30 years here today.  During the exam today, when  reminds her that she can't get her words out, the symptoms are reproduced.  She no longer needs O2 and was told she has pulmonary fibrosis but no O2 requirement.   RLS is well controlled currently    Review of Systems   Constitutional: Negative for fever.   HENT: Negative for nosebleeds.    Eyes: Negative for double vision.   Respiratory: Negative for hemoptysis.    Cardiovascular: Negative for leg swelling.   Gastrointestinal: Negative for blood in stool.   Genitourinary: Negative for hematuria.   Musculoskeletal: Negative for falls.   Skin: Negative for rash.   Neurological: Negative for focal weakness.    Endo/Heme/Allergies: Does not bruise/bleed easily.   Psychiatric/Behavioral: The patient has insomnia.        Exam:  Gen Appearance, well developed/nourished in no apparent distress  CV: 2+ distal pulses with mild dependent edema or swelling  Neuro:  MS: Awake, alert, Sustains attention and oriented times 4 today Recent/remote memory intact, Language is full to spontaneous speech/comprehension. Fund of Knowledge is full  Some speech hesitancy which is clearly improved with distraction  No longer with stuttering  CN: , PERRL, Extraoccular movements and visual fields are full. Normal facial sensation and strength, Hearing symmetric, Tongue and Palate are midline and strong. Shoulder Shrug symmetric and strong.  Motor: Normal bulk, tone reduced in the intrinsic hand and feet muscles, no abnormal movements at rest but there is mild tremor with outstretched hands. 5/5 strength bilateral upper/lower extremities with 2+ reflexes in the arms and 1+ in the knees, absent ankle jerks.   Sensory: symmetric to vibration and temp. Romberg negative  Cerebellar: Finger-nose,Rapid alternating movements intact  Gait: Normal stance, no ataxia, but arthralgic gait and some atasia abasia- is using walker    2017 Labs: Labs showed low sodium (130), elevated liver enzymes, and elevated TIBC /transferrin and reduction of Ferritin.   Review of Systems       4/2017 MRI Brain: Age-appropriate generalized volume loss with scattered foci of T2/FLAIR signal abnormality within the supratentorial white matter  while nonspecific suggestive for mild degree of degree of  chronic microvascular ischemic change.    No evidence for acute infarction or enhancing lesion.    Empty sella turcica configuration.    3/2017 Degenerative changes in the shoulder and neck by xray    2015 EEG: Clinical Impression:  Abnormal EEG secondary to the presence of   mild generalized slowing which is etiologically non-specific.     Assessment/Plan: Pepper Dow is  a 62 y.o. female  with DM,  lower back pain (focal) likely from  Annular tear at L5/S1 in addition to multiple level degenerative change of the L spine per 2012 MRI. 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 MRI brain 2015 unremarkable and no epileptiform discharges on EEG at that time.   2016 Neuropsych testing suggests mild neurocognitive disorder. Repeat MRI brain 2017 unremarkable  She continues to complain of spells of dizziness and confusion despite lack of need of O2 now per here.   I recommend:   1. Labs today: CMP,CBC, TSh, b12 today given her ongoing complaints. She reports pulmonary fibrosis not needing O2  2. Note her multiple somatic complaints noted prior  and her gait exam being  atasia abasia prior. MRI brain 2017 unremarkable for these symptoms  3. Check EEG long term monitor to try to capture a spell. Routine EEG normal prior  4. If the above is negative, consider conversion disorder as discussed today. Chronic migraine would be a consideration, but her exam seems more psychosomatic.   5. Her neuropathy could be challenging her balance and gait--continue  Cymbalta as well and completed PT .  She also has some long standing vertigo complaints at times as well   6. Mild neurocognitive disorder by 2016 Neuropsychological testing as well as anxiety depression (mood seems improved on Cymbalta). No need to repeat memory testing at this point given episodic nature of symptoms.   7. Also continue Metanx and Gabapentin for  Neuropathy. Pamelor caused side effects She failed Lyrica prior. No longer on Elavil and neuropathic cream did not help. The importance of good DM control needed to prevent worsening neuropathy reviewed.    8. Patient has been diagnosed with fibromyalgia (instead of  SALOMÓN).   9. She will continue Mirapex for RLS which is better controlled-- She has also had a few years of tremor with possible family history of this--tremor seems most consistent with benign essential tremor - monitor    RTC in 6 weeks.

## 2018-03-10 LAB — VIT B12 SERPL-MCNC: >2000 PG/ML

## 2018-04-20 ENCOUNTER — OFFICE VISIT (OUTPATIENT)
Dept: NEUROLOGY | Facility: CLINIC | Age: 63
End: 2018-04-20
Payer: COMMERCIAL

## 2018-04-20 VITALS
DIASTOLIC BLOOD PRESSURE: 72 MMHG | SYSTOLIC BLOOD PRESSURE: 126 MMHG | HEIGHT: 61 IN | WEIGHT: 270.75 LBS | HEART RATE: 66 BPM | RESPIRATION RATE: 20 BRPM | BODY MASS INDEX: 51.12 KG/M2

## 2018-04-20 DIAGNOSIS — G31.84 MILD NEUROCOGNITIVE DISORDER: ICD-10-CM

## 2018-04-20 DIAGNOSIS — G25.81 RLS (RESTLESS LEGS SYNDROME): ICD-10-CM

## 2018-04-20 DIAGNOSIS — R68.89 SPELLS OF DECREASED ATTENTIVENESS: Primary | ICD-10-CM

## 2018-04-20 DIAGNOSIS — R51.9 HEADACHE, CHRONIC DAILY: ICD-10-CM

## 2018-04-20 DIAGNOSIS — G62.9 NEUROPATHY: ICD-10-CM

## 2018-04-20 PROCEDURE — 99999 PR PBB SHADOW E&M-EST. PATIENT-LVL III: CPT | Mod: PBBFAC,,, | Performed by: PSYCHIATRY & NEUROLOGY

## 2018-04-20 PROCEDURE — 99214 OFFICE O/P EST MOD 30 MIN: CPT | Mod: S$GLB,,, | Performed by: PSYCHIATRY & NEUROLOGY
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
## 2018-04-26 ENCOUNTER — TELEPHONE (OUTPATIENT)
Dept: NEUROLOGY | Facility: CLINIC | Age: 63
End: 2018-04-26

## 2018-04-26 DIAGNOSIS — F44.9 CONVERSION REACTION: Primary | ICD-10-CM

## 2018-04-26 NOTE — TELEPHONE ENCOUNTER
Notify patient: EEG did not show epilepsy or seizures. Her brain activity looked ok during the time of the event of her confusion and stuttering speech. It could be stress or something called conversion reaction which is causing her symptoms. The treatment is to make sure mood is well treated and to see psychiatry and/or a counselor. I will refer her to psychiatry here.   It may be beneficial to help resolve the spells.

## 2018-05-25 ENCOUNTER — OFFICE VISIT (OUTPATIENT)
Dept: PSYCHIATRY | Facility: CLINIC | Age: 63
End: 2018-05-25
Attending: PSYCHIATRY & NEUROLOGY
Payer: COMMERCIAL

## 2018-05-25 VITALS
HEART RATE: 103 BPM | DIASTOLIC BLOOD PRESSURE: 88 MMHG | SYSTOLIC BLOOD PRESSURE: 134 MMHG | HEIGHT: 61 IN | WEIGHT: 266.75 LBS | BODY MASS INDEX: 50.36 KG/M2 | RESPIRATION RATE: 23 BRPM

## 2018-05-25 DIAGNOSIS — F33.1 MODERATE EPISODE OF RECURRENT MAJOR DEPRESSIVE DISORDER: Primary | ICD-10-CM

## 2018-05-25 DIAGNOSIS — M79.2 NEURALGIA AND NEURITIS: ICD-10-CM

## 2018-05-25 DIAGNOSIS — G62.9 NEUROPATHY: ICD-10-CM

## 2018-05-25 DIAGNOSIS — G31.84 MILD COGNITIVE IMPAIRMENT WITH MEMORY LOSS: ICD-10-CM

## 2018-05-25 PROCEDURE — 99999 PR PBB SHADOW E&M-EST. PATIENT-LVL III: CPT | Mod: PBBFAC,,, | Performed by: PSYCHIATRY & NEUROLOGY

## 2018-05-25 PROCEDURE — 90792 PSYCH DIAG EVAL W/MED SRVCS: CPT | Mod: S$GLB,,, | Performed by: PSYCHIATRY & NEUROLOGY

## 2018-05-25 RX ORDER — ARFORMOTEROL TARTRATE 15 UG/2ML
SOLUTION RESPIRATORY (INHALATION)
COMMUNITY
End: 2018-12-18

## 2018-05-25 RX ORDER — ESZOPICLONE 1 MG/1
1 TABLET, FILM COATED ORAL NIGHTLY
Qty: 15 TABLET | Refills: 0 | Status: SHIPPED | OUTPATIENT
Start: 2018-05-25 | End: 2018-06-08 | Stop reason: SDUPTHER

## 2018-05-25 RX ORDER — DULOXETIN HYDROCHLORIDE 60 MG/1
60 CAPSULE, DELAYED RELEASE ORAL DAILY
Qty: 90 CAPSULE | Refills: 0 | Status: SHIPPED | OUTPATIENT
Start: 2018-05-25 | End: 2018-08-06 | Stop reason: SDUPTHER

## 2018-05-25 NOTE — PROGRESS NOTES
"Outpatient Psychiatry Initial Visit (MD/NP)    5/25/2018    Pepper Dow, a 62 y.o. female, presenting for initial evaluation visit. Met with patient.    Reason for Encounter: Referral from Dr. Wood. Patient complains of   Chief Complaint   Patient presents with    Memory Deficits       History of Present Illness:     Patient last seen by psychiatry in early 1990s for depression.  Neurology has consulted for concern for dementia versus depression.  Patient at first resistant to the idea but then says she does believe she is depressed. She has significant problems with memory and has had testing which said her memory loss is accelerated for her age.  Patients memory and confusion improved since starting back on home oxygen.  She was exposed to asbestos and birds at a young age which has contributed to her lung problems.      Patient was on blood pressure medication that she abruptly stopped.  She said something to the effect of "i'll let god take me when its time" and she ended up admitted to a psych unit.      Current Medication  Cymbalta 30mg QDay - has helped with her toe pain  Ambien 10mg QHS (given by Ricardo Springer) discussed risks of this medication extensively    Past  Wellbutrin - "made me a zombie in 3 days"  Zoloft   Prozac    Psychosocial   almost 34 years -3 children and 10 grandchildren    Endorses Symptoms of Depression: +diminished mood or loss of +interest/anhedonia; +irritability, +diminished energy, +change in sleep, change in appetite, +diminished concentration or cognition or indecisiveness, no PMA/R, excessive guilt or hopelessness or +worthlessness, suicidal ideations    Depressed the past few months.  She no longer likes to do canvas or play her [employer] game called aqua life 3d. She feels like a burden on her family     Changes in Sleep: trouble with initiation, maintenance, early morning awakening with inability to return to sleep, hypersomnolence     "if it wasn't for the pills, " "I wouldn't sleep" She still wakes up several times during the night with ambien.  Prior to taking ambien she would only sleep an hour or two at a time, then awake, then sleeping    Suicidal/Homicidal ideations: active/passive ideations, organized plans, future intentions    Has had thoughts before but no actual attempts    Denies Symptoms of psychosis: hallucinations, delusions, disorganized thinking, disorganized behavior or abnormal motor behavior, or negative symptoms (diminshed emotional expression, avolition, anhedonia, alogia, asociality     Denies Symptoms of pat or hypomania: elevated, expansive, or irritable mood with increased energy or activity; with inflated self-esteem or grandiosity, decreased need for sleep, increased rate of speech, FOI or racing thoughts, distractibility, increased goal directed activity or PMA, risky/disinhibited behavior    Denies Symptoms of KATIE: excessive anxiety/worry/fear, more days than not, about numerous issues, difficult to control, with restlessness, fatigue, poor concentration, irritability, muscle tension, sleep disturbance; causes functionally impairing distress     Denies Symptoms of Panic Disorder: recurrent panic attacks (palpitations/heart racing, sweating, shakiness, dyspnea, choking, chest pain/discomfort, Gi symptoms, dizzy/lightheadedness, hot/col flashes, paresthesias, derealization, fear of losing control or fear of dying), precipitated or un-precipitated, source of worry and/or behavioral changes secondary, with or without agoraphobia    Symptoms of Social Anxiety Disorder: excessive fear/anxiety regarding social situations with fear of being negatively evaluated, avoidaant behavior, functionally impairing distress    Symptoms of specific phobias: marked fear of a specific object (animal, natural environment, blood-injection-injury, situational, other) with avoidance and functionally impairing distress    Denies Symptoms of PTSD: h/o trauma; " "re-experiencing/intrusive symptoms, avoidant behavior, negative alterations in cognition or mood, hyperarousal symptoms; with or without dissociative symptoms     Denies Substance Use: intoxication, withdrawal, tolerance, used in larger amounts or duration than intended, unsuccessful attempts to limit or quit, increased time engaging in or seeking out, cravings or strong desire to use, failure to fulfill obligations, negative consequences in social/interpersonal/occupational,/recreational areas, use in dangerous situations, medical or psychological consequences       Review Of Systems:     GENERAL:  No weight gain or loss  SKIN:  Slight discoloration on her face  HEAD:  No headaches  EYES:  No exophthalmos, jaundice or blindness  EARS:  No dizziness, tinnitus or hearing loss  NOSE:  No changes in smell  MOUTH & THROAT:  No dyskinetic movements or obvious goiter  CHEST:  No shortness of breath, hyperventilation or cough  CARDIOVASCULAR:  No tachycardia or chest pain  ABDOMEN:  No nausea, vomiting, pain, constipation or diarrhea  URINARY:  No frequency, dysuria or sexual dysfunction  ENDOCRINE:  No polydipsia, polyuria  MUSCULOSKELETAL:  No pain or stiffness of the joints  NEUROLOGIC:  No weakness, sensory changes, seizures, confusion, memory loss, tremor or other abnormal movements    No physical complaints today    Current Evaluation:     Nutritional Screening: Considering the patient's height and weight, medications, medical history and preferences, should a referral be made to the dietitian? yes    Constitutional  Vitals:  Most recent vital signs, dated less than 90 days prior to this appointment, were reviewed.    Vitals:    05/25/18 1257   BP: 134/88   Pulse: 103   Resp: (!) 23   Weight: 121 kg (266 lb 12.1 oz)   Height: 5' 1" (1.549 m)        General:  unremarkable, age appropriate     Musculoskeletal  Muscle Strength/Tone:  no spasicity, no rigidity   Gait & Station:  non-ataxic     Psychiatric  Speech:  no " latency; no press   Mood & Affect:  steady  congruent and appropriate   Thought Process:  normal and logical   Associations:  intact   Thought Content:  normal, no suicidality, no homicidality, delusions, or paranoia   Insight:  intact   Judgement: behavior is adequate to circumstances   Orientation:  grossly intact   Memory: intact for content of interview   Language: grossly intact   Attention Span & Concentration:  able to focus   Fund of Knowledge:  intact and appropriate to age and level of education       Relevant Elements of Neurological Exam: normal gait    Functioning in Relationships:  Spouse/partner: good  Peers: good  Employers: n/a    Laboratory Data  No visits with results within 1 Month(s) from this visit.   Latest known visit with results is:   Lab Visit on 03/09/2018   Component Date Value Ref Range Status    Sodium 03/09/2018 138  136 - 145 mmol/L Final    Potassium 03/09/2018 4.3  3.5 - 5.1 mmol/L Final    Chloride 03/09/2018 97  95 - 110 mmol/L Final    CO2 03/09/2018 28  23 - 29 mmol/L Final    Glucose 03/09/2018 214* 70 - 110 mg/dL Final    BUN, Bld 03/09/2018 28* 8 - 23 mg/dL Final    Creatinine 03/09/2018 1.2  0.5 - 1.4 mg/dL Final    Calcium 03/09/2018 9.7  8.7 - 10.5 mg/dL Final    Total Protein 03/09/2018 8.1  6.0 - 8.4 g/dL Final    Albumin 03/09/2018 3.8  3.5 - 5.2 g/dL Final    Total Bilirubin 03/09/2018 0.6  0.1 - 1.0 mg/dL Final    Alkaline Phosphatase 03/09/2018 115  55 - 135 U/L Final    AST 03/09/2018 61* 10 - 40 U/L Final    ALT 03/09/2018 66* 10 - 44 U/L Final    Anion Gap 03/09/2018 13  8 - 16 mmol/L Final    eGFR if  03/09/2018 56* >60 mL/min/1.73 m^2 Final    eGFR if non African American 03/09/2018 49* >60 mL/min/1.73 m^2 Final    WBC 03/09/2018 9.78  3.90 - 12.70 K/uL Final    RBC 03/09/2018 4.64  4.00 - 5.40 M/uL Final    Hemoglobin 03/09/2018 13.8  12.0 - 16.0 g/dL Final    Hematocrit 03/09/2018 42.3  37.0 - 48.5 % Final    MCV  03/09/2018 91  82 - 98 fL Final    MCH 03/09/2018 29.7  27.0 - 31.0 pg Final    MCHC 03/09/2018 32.6  32.0 - 36.0 g/dL Final    RDW 03/09/2018 14.7* 11.5 - 14.5 % Final    Platelets 03/09/2018 229  150 - 350 K/uL Final    MPV 03/09/2018 9.1* 9.2 - 12.9 fL Final    Gran # (ANC) 03/09/2018 5.8  1.8 - 7.7 K/uL Final    Lymph # 03/09/2018 2.3  1.0 - 4.8 K/uL Final    Mono # 03/09/2018 0.7  0.3 - 1.0 K/uL Final    Eos # 03/09/2018 0.9* 0.0 - 0.5 K/uL Final    Baso # 03/09/2018 0.06  0.00 - 0.20 K/uL Final    Gran% 03/09/2018 59.2  38.0 - 73.0 % Final    Lymph% 03/09/2018 23.8  18.0 - 48.0 % Final    Mono% 03/09/2018 6.9  4.0 - 15.0 % Final    Eosinophil% 03/09/2018 9.5* 0.0 - 8.0 % Final    Basophil% 03/09/2018 0.6  0.0 - 1.9 % Final    Differential Method 03/09/2018 Automated   Final    TSH 03/09/2018 1.107  0.400 - 4.000 uIU/mL Final    Vitamin B-12 03/09/2018 >2000* 210 - 950 pg/mL Final         Medications  Outpatient Encounter Prescriptions as of 5/25/2018   Medication Sig Dispense Refill    albuterol (ACCUNEB) 0.63 mg/3 mL Nebu Take 0.63 mg by nebulization every 6 (six) hours as needed.        allopurinol (ZYLOPRIM) 100 MG tablet Take 100 mg by mouth once daily.       arformoterol (BROVANA) 15 mcg/2 mL Nebu Brovana 15 mcg/2 mL solution for nebulization      aspirin (ECOTRIN) 81 MG EC tablet Take 81 mg by mouth once daily.        budesonide-formoterol 160-4.5 mcg (SYMBICORT) 160-4.5 mcg/actuation HFAA Inhale 2 puffs into the lungs every 12 (twelve) hours.      bumetanide (BUMEX) 1 MG tablet Take 1 mg by mouth once daily.      cetirizine (ZYRTEC) 10 MG tablet Take 10 mg by mouth once daily.      cyclobenzaprine (FLEXERIL) 10 MG tablet Take 10 mg by mouth every evening.      desonide (DESOWEN) 0.05 % cream Apply topically 2 (two) times daily.      DULoxetine (CYMBALTA) 60 MG capsule Take 1 capsule (60 mg total) by mouth once daily. 90 capsule 0    empagliflozin (JARDIANCE) 25 mg Tab Take  25 mg by mouth once daily.      gabapentin (NEURONTIN) 400 MG capsule Take 800 mg by mouth 3 (three) times daily.      hydrocodone-acetaminophen 5-325mg (NORCO) 5-325 mg per tablet Take 1 tablet by mouth every 6 (six) hours as needed for Pain.      hydrocortisone (WESTCORT) 0.2 % cream Apply topically 2 (two) times daily.      isopropyl alcohol-benzocaine (ALCOHOL-BENZOCAINE) 70-6 % PadM alcohol prep pads 70 % pads      ketoconazole (NIZORAL) 2 % cream Apply topically 2 (two) times daily.      levmefolate-B6 phos-methyl-B12 (METANX) 3-35-2 mg Tab Take 1 tablet by mouth 2 (two) times daily.        levothyroxine (SYNTHROID) 100 MCG tablet Take 100 mcg by mouth once daily.      LIRAGLUTIDE (VICTOZA 2-JUAN SUBQ) Inject into the skin once daily.      lovastatin (MEVACOR) 20 MG tablet Take 20 mg by mouth every evening.      metformin (FORTAMET) 1,000 mg 24 hr tablet Take 1,000 mg by mouth 2 (two) times daily with meals.        montelukast (SINGULAIR) 10 mg tablet Take 10 mg by mouth every evening.      olmesartan (BENICAR) 5 MG Tab Take 10 mg by mouth once daily.      pramipexole (MIRAPEX) 0.75 MG tablet Take 2 tablets (1.5 mg total) by mouth nightly. 180 tablet 4    [DISCONTINUED] duloxetine (CYMBALTA) 30 MG capsule Take 1 capsule (30 mg total) by mouth once daily. 30 capsule 3    eszopiclone (LUNESTA) 1 MG Tab Take 1 tablet (1 mg total) by mouth nightly. 15 tablet 0     No facility-administered encounter medications on file as of 5/25/2018.            Assessment - Diagnosis - Goals:     Impression:       ICD-10-CM ICD-9-CM   1. Moderate episode of recurrent major depressive disorder F33.1 296.32   2. Neuralgia and neuritis M79.2 729.2   3. Neuropathy G62.9 355.9   4. Mild cognitive impairment with memory loss G31.84 331.83     780.93       Strengths and Liabilities: Strength: Patient accepts guidance/feedback, Strength: Patient is expressive/articulate., Strength: Patient is intelligent., Strength: Patient  is motivated for change., Liability: Patient has poor health.    Treatment Goals:  Specify outcomes written in observable, behavioral terms:   cessation of depressive symptoms    Treatment Plan/Recommendations:   · The treatment plan and follow up plan were reviewed with the patient.  Stop Ambien, start lunesta 1mg at night for insomnia - do not take with other sedatives such as ambien or muscle relaxers.  Take in bed  Increase Cymbalta to 60mg QDay for Depression and neuropathy    Return to Clinic: 2 weeks    Counseling time: 20  Total time: 60  Consulting clinician was informed of the encounter and consult note.

## 2018-06-08 ENCOUNTER — OFFICE VISIT (OUTPATIENT)
Dept: PSYCHIATRY | Facility: CLINIC | Age: 63
End: 2018-06-08
Attending: PSYCHIATRY & NEUROLOGY
Payer: COMMERCIAL

## 2018-06-08 VITALS
SYSTOLIC BLOOD PRESSURE: 130 MMHG | BODY MASS INDEX: 50.96 KG/M2 | DIASTOLIC BLOOD PRESSURE: 76 MMHG | WEIGHT: 269.94 LBS | HEART RATE: 87 BPM | RESPIRATION RATE: 21 BRPM | HEIGHT: 61 IN

## 2018-06-08 DIAGNOSIS — F33.41 RECURRENT MAJOR DEPRESSIVE DISORDER, IN PARTIAL REMISSION: Primary | ICD-10-CM

## 2018-06-08 DIAGNOSIS — G47.00 INSOMNIA, UNSPECIFIED TYPE: ICD-10-CM

## 2018-06-08 DIAGNOSIS — G31.84 MILD COGNITIVE IMPAIRMENT WITH MEMORY LOSS: ICD-10-CM

## 2018-06-08 PROCEDURE — 99999 PR PBB SHADOW E&M-EST. PATIENT-LVL III: CPT | Mod: PBBFAC,,, | Performed by: PSYCHIATRY & NEUROLOGY

## 2018-06-08 PROCEDURE — 3008F BODY MASS INDEX DOCD: CPT | Mod: CPTII,S$GLB,, | Performed by: PSYCHIATRY & NEUROLOGY

## 2018-06-08 PROCEDURE — 90833 PSYTX W PT W E/M 30 MIN: CPT | Mod: S$GLB,,, | Performed by: PSYCHIATRY & NEUROLOGY

## 2018-06-08 PROCEDURE — 99213 OFFICE O/P EST LOW 20 MIN: CPT | Mod: S$GLB,,, | Performed by: PSYCHIATRY & NEUROLOGY

## 2018-06-08 RX ORDER — ESZOPICLONE 2 MG/1
2 TABLET, FILM COATED ORAL NIGHTLY
Qty: 30 TABLET | Refills: 0 | Status: SHIPPED | OUTPATIENT
Start: 2018-06-08 | End: 2018-06-08 | Stop reason: SDUPTHER

## 2018-06-08 RX ORDER — ESZOPICLONE 2 MG/1
2 TABLET, FILM COATED ORAL NIGHTLY
Qty: 30 TABLET | Refills: 1 | Status: SHIPPED | OUTPATIENT
Start: 2018-06-08 | End: 2018-07-08

## 2018-06-08 NOTE — PROGRESS NOTES
"Outpatient Psychiatry Follow-Up Visit (MD/NP)    6/8/2018    Clinical Status of Patient:  Outpatient (Ambulatory)    Chief Complaint:  Pepper Dow is a 63 y.o. female who presents today for follow-up of depression.  Met with patient.      Interval History and Content of Current Session:  Interim Events/Subjective Report/Content of Current Session:     She continues to have some cognitive impairment but her mood is substantially improved.      Current Medication  Cymbalta 60mg QDay - has helped with her toe pain  Lunesta 1mg QHS     Past  Wellbutrin - "made me a zombie in 3 days"  Zoloft   Prozac     Psychosocial   almost 34 years -3 children and 10 grandchildren    Work  She used to work as a caregiver for handicap persons    Exercise  Limited by her physical disability    Diet  Carbohydrates    Improving Symptoms of Depression: improved diminished mood or loss of improved interest/anhedonia; improved irritability, less diminished energy, +change in sleep, change in appetite, improved diminished concentration or cognition or indecisiveness, no PMA/R, excessive guilt or hopelessness or improved worthlessness, suicidal ideations     Depressed the past few months.  She no longer likes to do canvas or play her [employer] game called aqua life 3d. She feels like a burden on her family      Changes in Sleep: trouble with initiation, maintenance, early morning awakening with inability to return to sleep, hypersomnolence      "if it wasn't for the pills, I wouldn't sleep" She still wakes up several times during the night with ambien.  Prior to taking ambien she would only sleep an hour or two at a time, then awake, then sleeping     Suicidal/Homicidal ideations: active/passive ideations, organized plans, future intentions     Has had thoughts before but no actual attempts    No symptoms of anxiety pat or psychosis     Psychotherapy:  · Target symptoms: depression, lifestyle / diet  · Why chosen therapy is " "appropriate versus another modality: relevant to diagnosis  · Outcome monitoring methods: self-report, observation  · Therapeutic intervention type: behavior modifying psychotherapy, supportive psychotherapy  · Topics discussed/themes: stress related to medical comorbidities, financial stressors  · The patient's response to the intervention is accepting. The patient's progress toward treatment goals is good.   · Duration of intervention: 16 minutes.    Review of Systems     GENERAL: +weight gain  SKIN:  Slight discoloration on her face  HEAD:  No headaches  EYES:  No exophthalmos, jaundice or blindness  EARS:  No dizziness, tinnitus or hearing loss  NOSE:  No changes in smell  MOUTH & THROAT:  No dyskinetic movements or obvious goiter  CHEST:  No shortness of breath, hyperventilation or cough  CARDIOVASCULAR:  No tachycardia or chest pain  ABDOMEN:  No nausea, vomiting, pain, constipation or diarrhea  URINARY:  No frequency, dysuria or sexual dysfunction  ENDOCRINE:  No polydipsia, polyuria  MUSCULOSKELETAL:  No pain or stiffness of the joints  NEUROLOGIC:  No weakness, sensory changes, seizures, confusion, memory loss, tremor or other abnormal movements    Past Medical, Family and Social History: The patient's past medical, family and social history have been reviewed and updated as appropriate within the electronic medical record - see encounter notes.    Compliance: yes    Side effects: None    Risk Parameters:  Patient reports no suicidal ideation  Patient reports no homicidal ideation  Patient reports no self-injurious behavior  Patient reports no violent behavior    Exam (detailed: at least 9 elements; comprehensive: all 15 elements)   Constitutional  Vitals:  Most recent vital signs, dated less than 90 days prior to this appointment, were reviewed.   Vitals:    06/08/18 1131   BP: 130/76   Pulse: 87   Resp: (!) 21   Weight: 122.4 kg (269 lb 15.3 oz)   Height: 5' 1" (1.549 m)        General:  unremarkable, age " appropriate     Musculoskeletal  Muscle Strength/Tone:  no spasicity, no rigidity   Gait & Station:  non-ataxic     Psychiatric  Speech:  no latency; no press   Mood & Affect:  happy, euthymic  congruent and appropriate   Thought Process:  normal and logical   Associations:  intact   Thought Content:  normal, no suicidality, no homicidality, delusions, or paranoia   Insight:  intact   Judgement: behavior is adequate to circumstances   Orientation:  grossly intact   Memory: intact for content of interview   Language: grossly intact   Attention Span & Concentration:  able to focus   Fund of Knowledge:  intact and appropriate to age and level of education     Assessment and Diagnosis   Status/Progress: Based on the examination today, the patient's problem(s) is/are improved.  New problems have not been presented today.   Co-morbidities are not complicating management of the primary condition.  There are no active rule-out diagnoses for this patient at this time.     General Impression:       ICD-10-CM ICD-9-CM   1. Recurrent major depressive disorder, in partial remission F33.41 296.35   2. Mild cognitive impairment with memory loss G31.84 331.83     780.93   3. Insomnia, unspecified type G47.00 780.52       Intervention/Counseling/Treatment Plan   · Cymbalta 60mg QDay for Depression  · Increase Lunesta 2mg QHS for insomnia    Checked LA  and no irregularities were noted.    Discussed diagnosis, risks and benefits of proposed treatment vs alternative treatments vs no treatment, and potential side effects of these treatments. The patient expresses understanding of the above and displays the capacity to agree with this treatment given said understanding. Patient also agrees that, currently, the benefits outweigh the risks and would like to pursue treatment at this time.      Return to Clinic: 2 months, as needed

## 2018-07-17 ENCOUNTER — OFFICE VISIT (OUTPATIENT)
Dept: NEUROLOGY | Facility: CLINIC | Age: 63
End: 2018-07-17
Payer: COMMERCIAL

## 2018-07-17 VITALS
HEART RATE: 84 BPM | BODY MASS INDEX: 51.74 KG/M2 | DIASTOLIC BLOOD PRESSURE: 66 MMHG | RESPIRATION RATE: 24 BRPM | HEIGHT: 61 IN | SYSTOLIC BLOOD PRESSURE: 132 MMHG | WEIGHT: 274.06 LBS

## 2018-07-17 DIAGNOSIS — G31.84 MILD NEUROCOGNITIVE DISORDER: ICD-10-CM

## 2018-07-17 DIAGNOSIS — G25.81 RLS (RESTLESS LEGS SYNDROME): ICD-10-CM

## 2018-07-17 DIAGNOSIS — I68.0 CEREBRAL AMYLOID ANGIOPATHY: Primary | ICD-10-CM

## 2018-07-17 DIAGNOSIS — E85.4 CEREBRAL AMYLOID ANGIOPATHY: Primary | ICD-10-CM

## 2018-07-17 DIAGNOSIS — R25.1 TREMOR: ICD-10-CM

## 2018-07-17 DIAGNOSIS — G62.9 NEUROPATHY: ICD-10-CM

## 2018-07-17 DIAGNOSIS — R51.9 HEADACHE, CHRONIC DAILY: ICD-10-CM

## 2018-07-17 PROCEDURE — 3008F BODY MASS INDEX DOCD: CPT | Mod: CPTII,S$GLB,, | Performed by: PSYCHIATRY & NEUROLOGY

## 2018-07-17 PROCEDURE — 99999 PR PBB SHADOW E&M-EST. PATIENT-LVL III: CPT | Mod: PBBFAC,,, | Performed by: PSYCHIATRY & NEUROLOGY

## 2018-07-17 PROCEDURE — 99214 OFFICE O/P EST MOD 30 MIN: CPT | Mod: S$GLB,,, | Performed by: PSYCHIATRY & NEUROLOGY

## 2018-07-17 RX ORDER — INSULIN PUMP SYRINGE, 3 ML
EACH MISCELLANEOUS
COMMUNITY

## 2018-07-17 RX ORDER — MEMANTINE HYDROCHLORIDE 5 MG/1
TABLET ORAL
Qty: 180 TABLET | Refills: 3 | Status: SHIPPED | OUTPATIENT
Start: 2018-07-17 | End: 2019-05-12 | Stop reason: SDUPTHER

## 2018-07-17 RX ORDER — ESZOPICLONE 2 MG/1
TABLET, FILM COATED ORAL
COMMUNITY
End: 2018-08-06

## 2018-07-17 NOTE — PROGRESS NOTES
"  HPI: Pepper Dow is a 63 y.o. female  with DM,  lower back pain (focal) likely from  Annular tear at L5/S1 in addition to multiple level degenerative change of the L spine per 2012 MRI. 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 MRI brain 2015 unremarkable and no epileptiform discharges on EEG at that time.   2016 Neuropsych testing suggests mild neurocognitive disorder. Repeat MRI brain 2017 unremarkable  She continues to complain of spells of dizziness, stuttering and confusion.     She prior to her scheduled appointment as she states a recent MRI shows, "Mini-strokes."  I have reviewed her MRI report (See below) which was done for "Disorientation and confusion"  She still has some headaches at times.  She has some confusion at times, but not obvious seizures.   Reports stress is under good control.  Her mood has been good.    Review of Systems   Constitutional: Negative for fever.   HENT: Negative for nosebleeds.    Eyes: Negative for double vision.   Respiratory: Negative for hemoptysis.    Cardiovascular: Negative for leg swelling.   Gastrointestinal: Negative for blood in stool.   Genitourinary: Negative for hematuria.   Musculoskeletal: Negative for falls.   Skin: Negative for rash.   Neurological: Positive for speech change and headaches. Negative for tingling, focal weakness, seizures and loss of consciousness.   Endo/Heme/Allergies: Does not bruise/bleed easily.   Psychiatric/Behavioral: The patient has insomnia.        Exam:  Gen Appearance, well developed/nourished in no apparent distress  CV: 2+ distal pulses with mild dependent edema or swelling  Neuro:  MS: Awake, alert, Sustains attention and oriented times 4 today Recent/remote memory intact, Language is full to spontaneous " "speech/comprehension. Fund of Knowledge is full  Some speech hesitancy which is clearly improved with distraction again today  CN: , PERRL, Extraoccular movements and visual fields are full. Normal facial sensation and strength, Hearing symmetric, Tongue and Palate are midline and strong. Shoulder Shrug symmetric and strong.  Motor: Normal bulk, tone reduced in the intrinsic hand and feet muscles, no abnormal movements at rest but there is mild tremor with outstretched hands. 5/5 strength bilateral upper/lower extremities with 2+ reflexes in the arms and 1+ in the knees, absent ankle jerks.   Sensory: symmetric to vibration and temp. Romberg negative  Cerebellar: Finger-nose,Rapid alternating movements intact  Gait: Normal stance, no ataxia, but arthralgic gait and some atasia abasia noted prior is not noted today-     2018:  CMP, CBC, TSh, B12 stable    4/2017 MRI Brain: Age-appropriate generalized volume loss with scattered foci of T2/FLAIR signal abnormality within the supratentorial white matter  while nonspecific suggestive for mild degree of degree of  chronic microvascular ischemic change.    No evidence for acute infarction or enhancing lesion.    Empty sella turcica configuration.    3/2017 Degenerative changes in the shoulder and neck by xray    2015 EEG: Clinical Impression:  Abnormal EEG secondary to the presence of   mild generalized slowing which is etiologically non-specific.     5/2018 EEG ambulatory monitor: events of "feeling weird and body jerk" did not have an EEG correlate.     7/2018 MRI brain: 2-3 areas of bilateral areas of micro hemorrhages concerning for cerebral amyloid angiography, new from prior.     Assessment/Plan: Pepper Dow is a 63 y.o. female  with DM,  lower back pain (focal) likely from  Annular tear at L5/S1 in addition to multiple level degenerative change of the L spine per 2012 MRI. She has symptoms of RLS, Popliteal cyst in the left leg, and Mild Sensory and Motor " "Polyneuropathy in the feet by EMG/NCS  Patient had  spells of Shortness of breath, confusion and dizziness  In 2015 with documented O2 reduction-  NSIP per pulmonology at that time. MRI brain 2015 unremarkable and no epileptiform discharges on EEG at that time.   2016 Neuropsych testing suggests mild neurocognitive disorder. Repeat MRI brain 2017 unremarkable  She continues to complain of spells of dizziness, stuttering and confusion.   2018 MRI brain shows 2-3 areas of blooming artifact suspicious for amyloid angiopathy   I recommend:   1. MRI brain abnormality is consistent with her cogntive decline and spells of confusion which all concerning for cerebral amyloid angiopathy. This patient is high risk for the development of dementia/ Alzheimer's disease in the next few years. Will re-image PRN and new or worsening symptoms. Discussed etiology with patient/family  2. She reports pulmonary fibrosis and is now back on O2  3. Note her multiple somatic complaints noted prior  and her gait exam being  atasia abasia prior. MRI brain 2017 unremarkable for these symptoms at that time. Long term EEG monitor at home was negative for 2 spells of "feeling weird with body jerk." This could be related to her chronic migraine, neurocognitive disorder, or a stress disorder as reviewed.   4. Start namenda for dementia per orders unless side efects. Ok to continue ASA (benefits greater than risks given her cardiac history), but would avoid anti-coagulation given amyloid angiopathy.   5. Her neuropathy could be challenging her balance and gait in addition to amyloid angiopathy--continue  Cymbalta as well and completed PT .  She also has some long standing vertigo complaints at times as well   6. Mild neurocognitive disorder by 2016 Neuropsychological testing as well as anxiety depression (mood seems improved on Cymbalta). Consider repeat testing for any worsening.   7. Also continue Metanx and Gabapentin for  Neuropathy. Pamelor " caused side effects She failed Lyrica prior. No longer on Elavil and neuropathic cream did not help. The importance of good DM control needed to prevent worsening neuropathy reviewed.    8. Patient has been diagnosed with fibromyalgia (instead of RA).   9. She will continue Mirapex for RLS -She has also had a few years of tremor with possible family history of this--tremor seems most consistent with benign essential tremor - monitor as this could also be related to her neurodegenerative disorder.     RTC as scheduled    CC: Dr Springer

## 2018-07-25 ENCOUNTER — TELEPHONE (OUTPATIENT)
Dept: NEUROLOGY | Facility: CLINIC | Age: 63
End: 2018-07-25

## 2018-07-25 NOTE — TELEPHONE ENCOUNTER
----- Message from Laura Mackenzie sent at 2018  2:18 PM CDT -----  Contact: PATIENT  Pepper Dow  MRN: [id number]  : 1955  PCP: Ricardo Springer  Home Phone      [phone]  Work Phone      Not on file.  Mobile          [phone]      MESSAGE: Patient states that she has blurred vision, dizzy & confusion.        Phone: [phone]

## 2018-07-25 NOTE — TELEPHONE ENCOUNTER
Patient c/o headache, dizziness, confusion, and blurred vision that started after she awoke from a nap. Advised patient to report to nearest ED for evaluation/treatment. Patient stated that her daughter was on her way to get her to bring her.

## 2018-07-26 ENCOUNTER — TELEPHONE (OUTPATIENT)
Dept: NEUROLOGY | Facility: CLINIC | Age: 63
End: 2018-07-26

## 2018-07-26 NOTE — TELEPHONE ENCOUNTER
Patient notified, able to verbalize understanding. Patient will attempt to get records faxed to us for review.

## 2018-07-26 NOTE — TELEPHONE ENCOUNTER
----- Message from Laura Mackenzie sent at 2018 12:23 PM CDT -----  Contact: PATIENT  Pepper Dow  MRN: [id number]  : 1955  PCP: Ricardo Springer  Home Phone      [phone]  Work Phone      Not on file.  Mobile          [phone]      MESSAGE: Patient was seen in the ER last night for severe headaches.  She is wanting to know is there something that Dr. Wood can prescribe to help with the pain until the new medication takes effect.        Phone: [phone]

## 2018-07-26 NOTE — TELEPHONE ENCOUNTER
Did she have any imaging in the ER?   Was she given any meds?  I would like to review her ER report prior to making any more recommendations, but she may use a small amount of OTC meds like Tylenol PRN for headache a few days per week if needed. She is on a great deal of meds, and overusing as needed meds may make headaches worse.

## 2018-07-26 NOTE — TELEPHONE ENCOUNTER
I reviewed the patient's records. Her CT head was normal which is great news.   I don't have any new medication recommendations at this time as she is on a great deal of medications currently. I did recommend she try to reduce PRN medications which may worsen headaches.

## 2018-08-06 ENCOUNTER — TELEPHONE (OUTPATIENT)
Dept: NEUROLOGY | Facility: CLINIC | Age: 63
End: 2018-08-06

## 2018-08-06 ENCOUNTER — LAB VISIT (OUTPATIENT)
Dept: LAB | Facility: HOSPITAL | Age: 63
End: 2018-08-06
Attending: PSYCHIATRY & NEUROLOGY
Payer: COMMERCIAL

## 2018-08-06 ENCOUNTER — OFFICE VISIT (OUTPATIENT)
Dept: PSYCHIATRY | Facility: CLINIC | Age: 63
End: 2018-08-06
Attending: PSYCHIATRY & NEUROLOGY
Payer: COMMERCIAL

## 2018-08-06 VITALS
WEIGHT: 277 LBS | DIASTOLIC BLOOD PRESSURE: 74 MMHG | RESPIRATION RATE: 24 BRPM | BODY MASS INDEX: 52.3 KG/M2 | HEART RATE: 76 BPM | HEIGHT: 61 IN | SYSTOLIC BLOOD PRESSURE: 152 MMHG

## 2018-08-06 DIAGNOSIS — G62.9 NEUROPATHY: ICD-10-CM

## 2018-08-06 DIAGNOSIS — M79.2 NEURALGIA AND NEURITIS: ICD-10-CM

## 2018-08-06 DIAGNOSIS — G31.84 MILD COGNITIVE IMPAIRMENT WITH MEMORY LOSS: ICD-10-CM

## 2018-08-06 DIAGNOSIS — G47.00 INSOMNIA, UNSPECIFIED TYPE: ICD-10-CM

## 2018-08-06 DIAGNOSIS — F33.2 SEVERE EPISODE OF RECURRENT MAJOR DEPRESSIVE DISORDER, WITHOUT PSYCHOTIC FEATURES: Primary | ICD-10-CM

## 2018-08-06 LAB
ALBUMIN SERPL BCP-MCNC: 3.6 G/DL
ALP SERPL-CCNC: 104 U/L
ALT SERPL W/O P-5'-P-CCNC: 72 U/L
ANION GAP SERPL CALC-SCNC: 12 MMOL/L
AST SERPL-CCNC: 29 U/L
BILIRUB SERPL-MCNC: 0.3 MG/DL
BUN SERPL-MCNC: 41 MG/DL
CALCIUM SERPL-MCNC: 9.7 MG/DL
CHLORIDE SERPL-SCNC: 94 MMOL/L
CO2 SERPL-SCNC: 30 MMOL/L
CREAT SERPL-MCNC: 1.4 MG/DL
EST. GFR  (AFRICAN AMERICAN): 46 ML/MIN/1.73 M^2
EST. GFR  (NON AFRICAN AMERICAN): 40 ML/MIN/1.73 M^2
GLUCOSE SERPL-MCNC: 281 MG/DL
POTASSIUM SERPL-SCNC: 4.5 MMOL/L
PROT SERPL-MCNC: 7.5 G/DL
SODIUM SERPL-SCNC: 136 MMOL/L

## 2018-08-06 PROCEDURE — 3008F BODY MASS INDEX DOCD: CPT | Mod: CPTII,S$GLB,, | Performed by: PSYCHIATRY & NEUROLOGY

## 2018-08-06 PROCEDURE — 90833 PSYTX W PT W E/M 30 MIN: CPT | Mod: S$GLB,,, | Performed by: PSYCHIATRY & NEUROLOGY

## 2018-08-06 PROCEDURE — 99999 PR PBB SHADOW E&M-EST. PATIENT-LVL III: CPT | Mod: PBBFAC,,, | Performed by: PSYCHIATRY & NEUROLOGY

## 2018-08-06 PROCEDURE — 80053 COMPREHEN METABOLIC PANEL: CPT

## 2018-08-06 PROCEDURE — 99214 OFFICE O/P EST MOD 30 MIN: CPT | Mod: S$GLB,,, | Performed by: PSYCHIATRY & NEUROLOGY

## 2018-08-06 PROCEDURE — 36415 COLL VENOUS BLD VENIPUNCTURE: CPT

## 2018-08-06 RX ORDER — TRAZODONE HYDROCHLORIDE 100 MG/1
TABLET ORAL
Qty: 10 TABLET | Refills: 0 | Status: SHIPPED | OUTPATIENT
Start: 2018-08-06 | End: 2018-08-15 | Stop reason: SDUPTHER

## 2018-08-06 RX ORDER — DULOXETIN HYDROCHLORIDE 60 MG/1
60 CAPSULE, DELAYED RELEASE ORAL DAILY
Qty: 90 CAPSULE | Refills: 0 | Status: SHIPPED | OUTPATIENT
Start: 2018-08-06 | End: 2018-08-30 | Stop reason: SDUPTHER

## 2018-08-06 RX ORDER — TRAZODONE HYDROCHLORIDE 100 MG/1
TABLET ORAL
Qty: 30 TABLET | Refills: 0 | Status: SHIPPED | OUTPATIENT
Start: 2018-08-06 | End: 2018-08-06 | Stop reason: SDUPTHER

## 2018-08-06 NOTE — TELEPHONE ENCOUNTER
----- Message from Laura Mackenzie sent at 2018  1:19 PM CDT -----  Contact: PATIENT  Pepper Dow  MRN: [id number]  : 1955  PCP: Ricardo Springer  Home Phone      [phone]  Work Phone      Not on file.  Mobile          [phone]      MESSAGE: Patient states that she has been having severe headaches that nothing she has been taking is helping.  She would like to see if Dr. Wood can prescribe her something to help her.      Phone: [phone]

## 2018-08-06 NOTE — TELEPHONE ENCOUNTER
Patient states that she is still having severe headaches and is asking for medication to help. I advised of our conversation on 7/26/18 where you advised that she try to reduce PRN medications to help avoid worsening her headaches and that you did not have any new medication recommendations at this time. Patient is scheduled to follow up on 9/10/18.

## 2018-08-06 NOTE — PROGRESS NOTES
"Outpatient Psychiatry Follow-Up Visit (MD/NP)    8/6/2018    Clinical Status of Patient:  Outpatient (Ambulatory)    Chief Complaint:  Pepper Dow is a 63 y.o. female who presents today for follow-up of depression.  Met with patient.      Interval History and Content of Current Session:  Interim Events/Subjective Report/Content of Current Session:     Patient is having an increase in headaches and worsening breathing.  She explains that she is in pain and can't breath and just wishes she wasn't alive.  She has no plan to hurt herself.  She attempted OD 35 years ago.  She believes if she committed suicide she would go to [facility].  "I just wish god would take me home because of my suffering".        Current Medication  Cymbalta 60mg QDay - has helped with her toe pain  Lunesta 2mg QHS     Past  Wellbutrin - "made me a zombie in 3 days"  Zoloft   Prozac     Psychosocial   almost 34 years -3 children and 10 grandchildren    Work  She used to work as a caregiver for handicap persons    Exercise  Limited by her physical disability    Diet  Carbohydrates    Worsening Symptoms of Depression: +diminished mood or loss of improved interest/anhedonia; improved irritability, less diminished energy, +change in sleep, change in appetite, improved diminished concentration or cognition or indecisiveness, no PMA/R, excessive guilt or hopelessness or improved worthlessness, suicidal ideations     Depressed the past few months.  She no longer likes to do canvas or play her [employer] game called aqua life 3d. She feels like a burden on her family      Worsening Changes in Sleep: trouble with initiation, maintenance, early morning awakening with inability to return to sleep, hypersomnolence     Even with lunesta she is not sleeping well.  She has trouble falling asleep and staying asleep.  Her breathing makes her sleep worse, she has trouble using her cpap / oxygen machine.  We discussed how her sleep might be worsening her " depression.  Sleep doctors aware that shes taking lunesta.  She finds it helps only somewhat.          Suicidal/Homicidal ideations: active/passive ideations, organized plans, future intentions     She has passive wishes for death but no intention of suicide.      No symptoms of anxiety pat or psychosis     Psychotherapy:  · Target symptoms: depression, lifestyle / diet  · Why chosen therapy is appropriate versus another modality: relevant to diagnosis  · Outcome monitoring methods: self-report, observation  · Therapeutic intervention type: behavior modifying psychotherapy, supportive psychotherapy  · Topics discussed/themes: stress related to medical comorbidities, financial stressors  · The patient's response to the intervention is accepting. The patient's progress toward treatment goals is good.   · Duration of intervention: 16 minutes.    Review of Systems     GENERAL: +weight gain  SKIN:  Slight discoloration on her face  HEAD:  +headaches  EYES:  No exophthalmos, jaundice or blindness  EARS:  No dizziness, tinnitus or hearing loss  NOSE:  No changes in smell  MOUTH & THROAT:  No dyskinetic movements or obvious goiter  CHEST:  +shortness of breath  CARDIOVASCULAR:  No tachycardia or chest pain  ABDOMEN:  No nausea, vomiting, pain, constipation or diarrhea  URINARY:  No frequency, dysuria or sexual dysfunction  ENDOCRINE:  No polydipsia, polyuria  MUSCULOSKELETAL:  Feels weak in legs  NEUROLOGIC:  No weakness, sensory changes, seizures, confusion, memory loss, tremor or other abnormal movements    Past Medical, Family and Social History: The patient's past medical, family and social history have been reviewed and updated as appropriate within the electronic medical record - see encounter notes.    Compliance: yes    Side effects: None    Risk Parameters:  Patient reports no suicidal ideation  Patient reports no homicidal ideation  Patient reports no self-injurious behavior  Patient reports no violent  "behavior    Exam (detailed: at least 9 elements; comprehensive: all 15 elements)   Constitutional  Vitals:  Most recent vital signs, dated less than 90 days prior to this appointment, were reviewed.   Vitals:    08/06/18 1313   BP: (!) 152/74   Pulse: 76   Resp: (!) 24   Weight: 125.6 kg (277 lb)   Height: 5' 1" (1.549 m)        General:  unremarkable, age appropriate     Musculoskeletal  Muscle Strength/Tone:  no spasicity, no rigidity   Gait & Station:  non-ataxic     Psychiatric  Speech:  no latency; no press   Mood & Affect:  depressed  congruent and appropriate   Thought Process:  normal and logical   Associations:  intact   Thought Content:  normal, no suicidality, no homicidality, delusions, or paranoia   Insight:  intact   Judgement: behavior is adequate to circumstances   Orientation:  grossly intact   Memory: intact for content of interview   Language: grossly intact   Attention Span & Concentration:  able to focus   Fund of Knowledge:  intact and appropriate to age and level of education     Assessment and Diagnosis   Status/Progress: Based on the examination today, the patient's problem(s) is/are worsening.  New problems have been presented today.   Co-morbidities are complicating management of the primary condition.  There are no active rule-out diagnoses for this patient at this time.     General Impression:       ICD-10-CM ICD-9-CM   1. Severe episode of recurrent major depressive disorder, without psychotic features F33.2 296.33   2. Mild cognitive impairment with memory loss G31.84 331.83     780.93   3. Insomnia, unspecified type G47.00 780.52   4. Neuralgia and neuritis M79.2 729.2   5. Neuropathy G62.9 355.9       Intervention/Counseling/Treatment Plan   · Cymbalta 60mg QDay for Depression  · Discontinue LUnesta  · Start Trazodone 50-100mg QHS  · Get cmp to check for increase in LFT, cymbalta helping treat neuralgia / neuropathy    Safety plan discussed with patient, she is not actively suicidal " but suffering from physical illness.  So many of her symptoms are due to poor management of respiratory problems.  She is to have a sleep study soon.      Checked LA  and no irregularities were noted.    Discussed diagnosis, risks and benefits of proposed treatment vs alternative treatments vs no treatment, and potential side effects of these treatments. The patient expresses understanding of the above and displays the capacity to agree with this treatment given said understanding. Patient also agrees that, currently, the benefits outweigh the risks and would like to pursue treatment at this time.      Return to Clinic: 1 month

## 2018-08-14 ENCOUNTER — OFFICE VISIT (OUTPATIENT)
Dept: NEUROLOGY | Facility: CLINIC | Age: 63
End: 2018-08-14
Payer: COMMERCIAL

## 2018-08-14 VITALS
SYSTOLIC BLOOD PRESSURE: 116 MMHG | RESPIRATION RATE: 22 BRPM | BODY MASS INDEX: 51.73 KG/M2 | DIASTOLIC BLOOD PRESSURE: 66 MMHG | WEIGHT: 274 LBS | HEIGHT: 61 IN | HEART RATE: 78 BPM

## 2018-08-14 DIAGNOSIS — I68.0 CEREBRAL AMYLOID ANGIOPATHY: Primary | ICD-10-CM

## 2018-08-14 DIAGNOSIS — R25.1 TREMOR: ICD-10-CM

## 2018-08-14 DIAGNOSIS — E85.4 CEREBRAL AMYLOID ANGIOPATHY: Primary | ICD-10-CM

## 2018-08-14 DIAGNOSIS — F44.9 CONVERSION REACTION: ICD-10-CM

## 2018-08-14 DIAGNOSIS — R51.9 HEADACHE, CHRONIC DAILY: ICD-10-CM

## 2018-08-14 DIAGNOSIS — G62.9 NEUROPATHY: ICD-10-CM

## 2018-08-14 DIAGNOSIS — G25.81 RLS (RESTLESS LEGS SYNDROME): ICD-10-CM

## 2018-08-14 DIAGNOSIS — G31.84 MILD NEUROCOGNITIVE DISORDER: ICD-10-CM

## 2018-08-14 PROCEDURE — 3008F BODY MASS INDEX DOCD: CPT | Mod: CPTII,S$GLB,, | Performed by: PSYCHIATRY & NEUROLOGY

## 2018-08-14 PROCEDURE — 99214 OFFICE O/P EST MOD 30 MIN: CPT | Mod: S$GLB,,, | Performed by: PSYCHIATRY & NEUROLOGY

## 2018-08-14 PROCEDURE — 99999 PR PBB SHADOW E&M-EST. PATIENT-LVL V: CPT | Mod: PBBFAC,,, | Performed by: PSYCHIATRY & NEUROLOGY

## 2018-08-14 RX ORDER — BUTALBITAL, ACETAMINOPHEN AND CAFFEINE 50; 325; 40 MG/1; MG/1; MG/1
1 TABLET ORAL EVERY 4 HOURS PRN
COMMUNITY

## 2018-08-14 NOTE — PROGRESS NOTES
"HPI: Pepper Dow is a 63 y.o. female  with DM,  lower back pain (focal) likely from  Annular tear at L5/S1 in addition to multiple level degenerative change of the L spine per 2012 MRI. 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 MRI brain 2015 unremarkable and no epileptiform discharges on EEG at that time.     The patient has called several times since the last visit about severe headaches, confusion and dizziness  She reported to [facility] ER in July with this and had negative head CT  She was asked to reduce PRN meds  She made this appointment today.  She states she had a spell 3 days ago in which she felt pain radiating down the left head, down the left shoulder and down the left arm which has limited her walking and speaking. She feels a "wave" over my brain since then. She has difficulty with speech since then which is hesitant. She has pain in the head  PCP did reduce her pain meds and increased gabapentin but she is using fioricet one per day.   She continues to use 81mg ASA daily/ no changes  She continues to have a tremor at times and memory is still a challange    She did see psychiatry since the last visit who is treating her major depression- trazodone started    She is drinking a 32 oz diet coke which she states she drinks one per day and dines out a great deal      Review of Systems   Constitutional: Negative for fever.   HENT: Negative for nosebleeds.    Eyes: Negative for double vision.   Respiratory: Negative for hemoptysis.    Cardiovascular: Negative for leg swelling.   Gastrointestinal: Negative for blood in stool.   Genitourinary: Negative for hematuria.   Musculoskeletal: Negative for falls.   Skin: Negative for rash.   Neurological: Positive for speech change and " "headaches. Negative for tingling, focal weakness, seizures and loss of consciousness.   Endo/Heme/Allergies: Does not bruise/bleed easily.   Psychiatric/Behavioral: The patient has insomnia.        Exam:  Gen Appearance, well developed/nourished in no apparent distress  CV: 2+ distal pulses with mild dependent edema or swelling  Neuro:  MS: Awake, alert, Sustains attention and oriented times 4 today Recent/remote memory intact, Language is full to spontaneous speech/comprehension. Fund of Knowledge is full  Some speech hesitancy which is clearly improved with distraction again today  CN: , PERRL, Extraoccular movements and visual fields are full. Normal facial sensation and strength, Hearing symmetric, Tongue and Palate are midline and strong. Shoulder Shrug symmetric and strong.  Motor: Normal bulk, tone reduced in the intrinsic hand and feet muscles, no abnormal movements at rest but there is mild tremor with outstretched hands. 5/5 strength bilateral upper/lower extremities except that she refuses to use the left leg, with 2+ reflexes in the arms and 1+ in the knees, absent ankle jerks.   Sensory: unreliable, Romberg not done   Cerebellar:unreliable  Gait: ? Gait refusal- some atasia abasia noted prior    2018:  CMP, CBC, TSh, B12 stable    4/2017 MRI Brain: Age-appropriate generalized volume loss with scattered foci of T2/FLAIR signal abnormality within the supratentorial white matter  while nonspecific suggestive for mild degree of degree of  chronic microvascular ischemic change.    No evidence for acute infarction or enhancing lesion.    Empty sella turcica configuration.    3/2017 Degenerative changes in the shoulder and neck by xray    2015 EEG: Clinical Impression:  Abnormal EEG secondary to the presence of   mild generalized slowing which is etiologically non-specific.     5/2018 EEG ambulatory monitor: events of "feeling weird and body jerk" did not have an EEG correlate.     7/2018 MRI brain: 2-3 areas " "of bilateral areas of micro hemorrhages concerning for cerebral amyloid angiography, new from prior.     Assessment/Plan: Pepper Dow is a 63 y.o. female  with DM,  lower back pain (focal) likely from  Annular tear at L5/S1 in addition to multiple level degenerative change of the L spine per 2012 MRI. 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 MRI brain 2015 unremarkable and no epileptiform discharges on EEG at that time.   2016 Neuropsych testing suggests mild neurocognitive disorder. Repeat MRI brain 2017 unremarkable  She continues to complain of spells of dizziness, stuttering and confusion.   6/2018 MRI brain shows 2-3 areas of blooming artifact suspicious for amyloid angiopathy   She is here again today complaining of new speech challenges and left sided weakness, gait changes and head pain  I recommend:     1. Will update MRI to be sure no new blooming artifact, hemorrhage, or CVA given her prior amyloid history  2. If there are no new changes, I suspect these prolonged symptoms are related to mood/conversion as stated prior given her long term EEG monitor at home was negative for 2 spells of "feeling weird with body jerk" and multiple somatic symptoms prior  3. She is now being treated for depression with psychiatry  4. She is pending a another sleep study with pulmonology. She reports pulmonary fibrosis and is now back on O2.  5.  PCP  also recently raised her dose of gabapentin for better headache prevention. If MRI is negative for acute change, she will need to avoid using anything to stop a headache (like Fioricet) more than 2 days per week. She also needs to stop over-consuming soft drink and improve her diet for better headache control  6. Prior MRI brain abnormality is " consistent with her cogntive decline and some spells of confusion which all concerning for cerebral amyloid angiopathy. This patient is high risk for the development of dementia/ Alzheimer's disease in the next few years and some of her mood/?behavioral changes may be attributed to this.   7. Continue namenda for dementia (states headaches predate this). Ok to continue ASA (benefits greater than risks given her cardiac history), but would avoid anti-coagulation given amyloid angiopathy.   8. Her neuropathy could be challenging her balance and gait in addition to amyloid angiopathy--continue  Cymbalta as well and completed PT .  She also has some long standing vertigo complaints at times as well   9. Mild neurocognitive disorder by 2016 Neuropsychological testing as well as anxiety depression (mood seems improved on Cymbalta). Consider repeat testing for any worsening.   10. Also continue Metanx and Gabapentin for  Neuropathy. Pamelor caused side effects She failed Lyrica prior. No longer on Elavil and neuropathic cream did not help. The importance of good DM control needed to prevent worsening neuropathy reviewed.    11. Patient has been diagnosed with fibromyalgia (instead of RA).   12. She will continue Mirapex for RLS -She has also had a few years of tremor with possible family history of this--tremor seems most consistent with benign essential tremor - monitor as this could also be related to her neurodegenerative disorder.     RTC  6weeks.     CC: Dr Springer and Dr Zafar

## 2018-08-15 NOTE — TELEPHONE ENCOUNTER
Patient states her prescription of Trazodone 100 mg was sent to [facility]. She still has not received her medication. Phoned [facility] and was told this medication would not arrive at patient's house until 8/22. Patient is asking if you can send a script for a few days to [facility].

## 2018-08-16 ENCOUNTER — TELEPHONE (OUTPATIENT)
Dept: NEUROLOGY | Facility: CLINIC | Age: 63
End: 2018-08-16

## 2018-08-16 DIAGNOSIS — G44.021 INTRACTABLE CHRONIC CLUSTER HEADACHE: ICD-10-CM

## 2018-08-16 DIAGNOSIS — I68.0 CEREBRAL AMYLOID ANGIOPATHY: Primary | ICD-10-CM

## 2018-08-16 DIAGNOSIS — R68.89 SPELLS OF DECREASED ATTENTIVENESS: ICD-10-CM

## 2018-08-16 DIAGNOSIS — E85.4 CEREBRAL AMYLOID ANGIOPATHY: Primary | ICD-10-CM

## 2018-08-16 DIAGNOSIS — I69.30 LATE EFFECT OF CEREBROVASCULAR ACCIDENT (CVA): ICD-10-CM

## 2018-08-16 RX ORDER — TRAZODONE HYDROCHLORIDE 100 MG/1
TABLET ORAL
Qty: 10 TABLET | Refills: 0 | Status: SHIPPED | OUTPATIENT
Start: 2018-08-16 | End: 2018-09-18 | Stop reason: SDUPTHER

## 2018-08-16 NOTE — TELEPHONE ENCOUNTER
Spoke with Patrick Perkins of the [facility] Radiology department he states that patient is going do an MRI with them tomorrow and  the radiologist is questioning if patient needs the contrast as her Creatine was 1.4 on 8/6 and she is a diabetic. Per  MRI can be done without contrast, Patrick voiced understanding states that he needs a new order and update authorization reflecting the change.

## 2018-08-16 NOTE — TELEPHONE ENCOUNTER
Done. Agree. Thanks. We are looking for any new bleeding/blooming here. Contrast is less beneficial

## 2018-08-20 ENCOUNTER — TELEPHONE (OUTPATIENT)
Dept: NEUROLOGY | Facility: CLINIC | Age: 63
End: 2018-08-20

## 2018-08-21 NOTE — TELEPHONE ENCOUNTER
Patient notified, able to verbalize understanding. Patient is asking if she can take oral Magnesium. She says that she has a lot of symptoms like headaches, nausea and confusion and she has read that these symptoms could be caused by low magnesium. Please advise.

## 2018-08-21 NOTE — TELEPHONE ENCOUNTER
It is ok with me if it is ok with her other providers to try Mag oxide 200mg BID unless diarrhea.  Thanks

## 2018-08-30 DIAGNOSIS — G62.9 NEUROPATHY: ICD-10-CM

## 2018-08-30 DIAGNOSIS — M79.2 NEURALGIA AND NEURITIS: ICD-10-CM

## 2018-08-30 RX ORDER — DULOXETIN HYDROCHLORIDE 60 MG/1
CAPSULE, DELAYED RELEASE ORAL
Qty: 90 CAPSULE | Refills: 0 | Status: SHIPPED | OUTPATIENT
Start: 2018-08-30 | End: 2018-12-18 | Stop reason: SDUPTHER

## 2018-09-06 RX ORDER — TRAZODONE HYDROCHLORIDE 100 MG/1
TABLET ORAL
Qty: 30 TABLET | Refills: 0 | Status: SHIPPED | OUTPATIENT
Start: 2018-09-06 | End: 2018-09-18 | Stop reason: SDUPTHER

## 2018-09-14 RX ORDER — TRAZODONE HYDROCHLORIDE 100 MG/1
TABLET ORAL
Qty: 30 TABLET | Refills: 0 | Status: SHIPPED | OUTPATIENT
Start: 2018-09-14 | End: 2018-09-18 | Stop reason: SDUPTHER

## 2018-09-18 ENCOUNTER — OFFICE VISIT (OUTPATIENT)
Dept: PSYCHIATRY | Facility: CLINIC | Age: 63
End: 2018-09-18
Attending: PSYCHIATRY & NEUROLOGY
Payer: COMMERCIAL

## 2018-09-18 VITALS
BODY MASS INDEX: 52.89 KG/M2 | RESPIRATION RATE: 16 BRPM | WEIGHT: 280.13 LBS | SYSTOLIC BLOOD PRESSURE: 102 MMHG | HEART RATE: 64 BPM | DIASTOLIC BLOOD PRESSURE: 62 MMHG | HEIGHT: 61 IN

## 2018-09-18 DIAGNOSIS — F33.41 RECURRENT MAJOR DEPRESSIVE DISORDER, IN PARTIAL REMISSION: Primary | ICD-10-CM

## 2018-09-18 DIAGNOSIS — G47.00 INSOMNIA, UNSPECIFIED TYPE: ICD-10-CM

## 2018-09-18 PROCEDURE — 90833 PSYTX W PT W E/M 30 MIN: CPT | Mod: S$GLB,,, | Performed by: PSYCHIATRY & NEUROLOGY

## 2018-09-18 PROCEDURE — 99999 PR PBB SHADOW E&M-EST. PATIENT-LVL III: CPT | Mod: PBBFAC,,, | Performed by: PSYCHIATRY & NEUROLOGY

## 2018-09-18 PROCEDURE — 3008F BODY MASS INDEX DOCD: CPT | Mod: CPTII,S$GLB,, | Performed by: PSYCHIATRY & NEUROLOGY

## 2018-09-18 PROCEDURE — 99214 OFFICE O/P EST MOD 30 MIN: CPT | Mod: S$GLB,,, | Performed by: PSYCHIATRY & NEUROLOGY

## 2018-09-18 RX ORDER — TRAZODONE HYDROCHLORIDE 100 MG/1
100 TABLET ORAL NIGHTLY
Qty: 90 TABLET | Refills: 0 | Status: SHIPPED | OUTPATIENT
Start: 2018-09-18 | End: 2018-12-18 | Stop reason: SDUPTHER

## 2018-09-18 NOTE — PROGRESS NOTES
"Outpatient Psychiatry Follow-Up Visit (MD/NP)    9/18/2018    Clinical Status of Patient:  Outpatient (Ambulatory)    Chief Complaint:  Pepper Dow is a 63 y.o. female who presents today for follow-up of depression.  Met with patient.      Interval History and Content of Current Session:  Interim Events/Subjective Report/Content of Current Session:     Patients ambulating and her breathing has improved.  Her passive suicidal ideation has improved.  She continues to deny suicidal ideation.  She is frustrated with not being able to drive and constantly having to have someone with her.        Current Medication  Cymbalta 60mg QDay - has helped with her toe pain  Trazodone 50mg QHS     Past  Wellbutrin - "made me a zombie in 3 days"  Zoloft   Prozac     Psychosocial   almost 34 years -3 children and 10 grandchildren    Work  She used to work as a caregiver for handicap persons    Exercise  Limited by her physical disability    Diet  Carbohydrates    Improving Symptoms of Depression: less diminished mood or loss of improved interest/anhedonia; improved irritability, improved diminished energy, +change in sleep, change in appetite, improved diminished concentration or cognition or indecisiveness, no PMA/R, no excessive guilt or hopelessness or improved worthlessness, suicidal ideations     Varied Changes in Sleep: trouble with initiation, maintenance, early morning awakening with inability to return to sleep, hypersomnolence   .     Her sleeps is varied, she feels like trazodone is helpful but her cpap causes her trouble.   She is going to have her cpap adjusted in November.      Improved Suicidal/Homicidal ideations: active/passive ideations, organized plans, future intentions     She has less of a desire for death, no suicidal intention.        No symptoms of anxiety pat or psychosis     Psychotherapy:  · Target symptoms: depression, lifestyle / diet  · Why chosen therapy is appropriate versus another " "modality: relevant to diagnosis  · Outcome monitoring methods: self-report, observation  · Therapeutic intervention type: behavior modifying psychotherapy, supportive psychotherapy  · Topics discussed/themes: stress related to medical comorbidities, financial stressors  · The patient's response to the intervention is accepting. The patient's progress toward treatment goals is good.   · Duration of intervention: 16 minutes.    Review of Systems     GENERAL: Continues to gain weight  SKIN:  Slight discoloration on her face  HEAD:  no headache  EYES:  No exophthalmos, jaundice or blindness  EARS:  No dizziness, tinnitus or hearing loss  NOSE:  No changes in smell  MOUTH & THROAT:  No dyskinetic movements or obvious goiter  CHEST:  +shortness of breath  CARDIOVASCULAR:  No tachycardia or chest pain  ABDOMEN:  No nausea, vomiting, pain, constipation or diarrhea  URINARY:  No frequency, dysuria or sexual dysfunction  ENDOCRINE:  No polydipsia, polyuria  MUSCULOSKELETAL:  Feels weak in legs  NEUROLOGIC:  No weakness, sensory changes, seizures, confusion, memory loss, tremor or other abnormal movements    Past Medical, Family and Social History: The patient's past medical, family and social history have been reviewed and updated as appropriate within the electronic medical record - see encounter notes.    Compliance: yes    Side effects: None    Risk Parameters:  Patient reports no suicidal ideation  Patient reports no homicidal ideation  Patient reports no self-injurious behavior  Patient reports no violent behavior    Exam (detailed: at least 9 elements; comprehensive: all 15 elements)   Constitutional  Vitals:  Most recent vital signs, dated less than 90 days prior to this appointment, were reviewed.   Vitals:    09/18/18 1355   BP: 102/62   Pulse: 64   Resp: 16   Weight: 127 kg (280 lb 1.5 oz)   Height: 5' 1" (1.549 m)        General:  unremarkable, age appropriate     Musculoskeletal  Muscle Strength/Tone:  no " spasicity, no rigidity   Gait & Station:  non-ataxic     Psychiatric  Speech:  no latency; no press   Mood & Affect:  happy, euthymic  congruent and appropriate   Thought Process:  normal and logical   Associations:  intact   Thought Content:  normal, no suicidality, no homicidality, delusions, or paranoia   Insight:  intact   Judgement: behavior is adequate to circumstances   Orientation:  grossly intact   Memory: intact for content of interview   Language: grossly intact   Attention Span & Concentration:  able to focus   Fund of Knowledge:  intact and appropriate to age and level of education     Assessment and Diagnosis   Status/Progress: Based on the examination today, the patient's problem(s) is/are improved.  New problems have not been presented today.   Co-morbidities are complicating management of the primary condition.  There are no active rule-out diagnoses for this patient at this time.     General Impression:       ICD-10-CM ICD-9-CM   1. Recurrent major depressive disorder, in partial remission F33.41 296.35   2. Insomnia, unspecified type G47.00 780.52       Intervention/Counseling/Treatment Plan   · Cymbalta 60mg QDay for Depression  · Continue Trazodone 50-100mg QHS  · CMP reviewed.  Her ALT is not much changed from march 2017 and AST normalized    I encouraged her to call for a sooner appointment for her cpap    Checked LA  and no irregularities were noted.    Discussed diagnosis, risks and benefits of proposed treatment vs alternative treatments vs no treatment, and potential side effects of these treatments. The patient expresses understanding of the above and displays the capacity to agree with this treatment given said understanding. Patient also agrees that, currently, the benefits outweigh the risks and would like to pursue treatment at this time.      Return to Clinic: 3 months

## 2018-10-02 ENCOUNTER — OFFICE VISIT (OUTPATIENT)
Dept: NEUROLOGY | Facility: CLINIC | Age: 63
End: 2018-10-02
Payer: COMMERCIAL

## 2018-10-02 VITALS
WEIGHT: 281.75 LBS | SYSTOLIC BLOOD PRESSURE: 120 MMHG | DIASTOLIC BLOOD PRESSURE: 76 MMHG | HEIGHT: 61 IN | RESPIRATION RATE: 18 BRPM | HEART RATE: 72 BPM | BODY MASS INDEX: 53.19 KG/M2

## 2018-10-02 DIAGNOSIS — G47.33 OSA (OBSTRUCTIVE SLEEP APNEA): ICD-10-CM

## 2018-10-02 DIAGNOSIS — E78.5 HYPERLIPIDEMIA, UNSPECIFIED HYPERLIPIDEMIA TYPE: ICD-10-CM

## 2018-10-02 DIAGNOSIS — E11.65 UNCONTROLLED TYPE 2 DIABETES MELLITUS WITH HYPERGLYCEMIA: ICD-10-CM

## 2018-10-02 DIAGNOSIS — G62.9 NEUROPATHY: ICD-10-CM

## 2018-10-02 DIAGNOSIS — J84.10 PULMONARY FIBROSIS: ICD-10-CM

## 2018-10-02 DIAGNOSIS — I25.10 CORONARY ARTERY DISEASE, ANGINA PRESENCE UNSPECIFIED, UNSPECIFIED VESSEL OR LESION TYPE, UNSPECIFIED WHETHER NATIVE OR TRANSPLANTED HEART: ICD-10-CM

## 2018-10-02 DIAGNOSIS — I10 HYPERTENSION, UNSPECIFIED TYPE: ICD-10-CM

## 2018-10-02 DIAGNOSIS — G25.81 RLS (RESTLESS LEGS SYNDROME): ICD-10-CM

## 2018-10-02 DIAGNOSIS — E03.9 HYPOTHYROIDISM, UNSPECIFIED TYPE: ICD-10-CM

## 2018-10-02 DIAGNOSIS — R06.02 SOB (SHORTNESS OF BREATH): Primary | ICD-10-CM

## 2018-10-02 PROCEDURE — 99214 OFFICE O/P EST MOD 30 MIN: CPT | Mod: S$GLB,,, | Performed by: NURSE PRACTITIONER

## 2018-10-02 PROCEDURE — 3008F BODY MASS INDEX DOCD: CPT | Mod: CPTII,S$GLB,, | Performed by: NURSE PRACTITIONER

## 2018-10-02 PROCEDURE — 99999 PR PBB SHADOW E&M-EST. PATIENT-LVL V: CPT | Mod: PBBFAC,,, | Performed by: NURSE PRACTITIONER

## 2018-10-02 NOTE — PATIENT INSTRUCTIONS
Please see PCP regarding SOB.     Please speak to PCP or pulmonology regarding your CPAP machine.

## 2018-10-02 NOTE — PROGRESS NOTES
HPI: Pepper Dow is a 63 y.o. female  with DM,  lower back pain (focal) likely from  Annular tear at L5/S1 in addition to multiple level degenerative change of the L spine per 2012 MRI. 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 Patient had  spells of Shortness of breath, confusion and dizziness  In 2015 with documented O2 reduction-  NSIP per pulmonology at that time. MRI brain 2015 unremarkable and no epileptiform discharges on EEG at that time. She has CAD, CHF, pulmonary fibrosis, DM Type II, HTN, HLD, PAD, neuropathy, thyroid disease, and history of psychiatric hospitalization.     She presents today for a follow up visit. Complains of headaches, lightheadedness, dizziness, SOB, and blurred vision. She completed an MRI Brain at her last visit at [facility], which showed no changed from her 2017 study. Per patient, she has been doing very well, walking without a walker, without confusion, or headache, but began to feel bad an hour before her visit with me today.     SOB is chronic and related to her pulmonary fibrosis and is not new.    She is pending bilateral cataract surgery with [facility].     Headaches were improved since her last visit, but she does have a headache today. Headaches is located to the left temporal area, and began an hour ago. She has not taken a Fioricet, and has not needed to use an abortive medication since her Gabapentin was increased.     She tried taking Magnesium supplements for headaches, confusion, and nausea, as she believed that she could have a Magnesium deficiency; however, this caused confusion per patient.     She continues with memory issues, as well as depression. She continues to see Psychiatry, but will see him every 3 months. She sees Dr. Pickens.     She was diagnosed with sleep apnea, but she does not have a CPAP yet.     ROS unreliable, due to florid positivity    Review of Systems   Unable to  perform ROS: Other     Exam:  Gen Appearance, well developed/nourished in no apparent distress  CV: 2+ distal pulses with mild dependent edema or swelling  Neuro:  MS: Awake, alert, Sustains attention and oriented times 4 today Recent/remote memory intact, Language is full to spontaneous speech/comprehension. Fund of Knowledge is full  Some speech hesitancy which is clearly improved with distraction again today  CN: , PERRL, Extraoccular movements and visual fields are full. Normal facial sensation and strength, Hearing symmetric, Tongue and Palate are midline and strong. Shoulder Shrug symmetric and strong.  Motor: Normal bulk, tone reduced in the intrinsic hand and feet muscles, no abnormal movements at rest but there is mild tremor with outstretched hands. 5/5 strength bilateral upper/lower extremities except that she refuses to use the left leg, with 2+ reflexes in the arms and 1+ in the knees, absent ankle jerks.   Sensory: unreliable, Romberg not done   Cerebellar:unreliable  Gait: ambulates with walker; no atasia/abasia today  MSK Survey: mild left occipital tenderness    Labs:  3/2018:  CMP, CBC, TSh, B12 stable  2018 random blood glucose of  281    Imagin2018 MRI Brain LOS:   Unchanged from prior study.     2018 MRI brain: 2-3 areas of bilateral areas of micro hemorrhages concerning for cerebral amyloid angiography, new from prior.   2017 MRI Brain: Age-appropriate generalized volume loss with scattered foci of T2/FLAIR signal abnormality within the supratentorial white matter  while nonspecific suggestive for mild degree of degree of  chronic microvascular ischemic change.    No evidence for acute infarction or enhancing lesion.    Empty sella turcica configuration.    3/2017 Degenerative changes in the shoulder and neck by xray     EEG: Clinical Impression:  Abnormal EEG secondary to the presence of   mild generalized slowing which is etiologically non-specific.     2018 EEG ambulatory  "monitor: events of "feeling weird and body jerk" did not have an EEG correlate.     Assessment/Plan: Pepper Dow is a 63 y.o. female  with DM,  lower back pain (focal) likely from  Annular tear at L5/S1 in addition to multiple level degenerative change of the L spine per 2012 MRI. 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 Patient had  spells of Shortness of breath, confusion and dizziness  In 2015 with documented O2 reduction-  NSIP per pulmonology at that time. MRI brain 2015 unremarkable and no epileptiform discharges on EEG at that time. 2016 Neuropsych testing suggests mild neurocognitive disorder. Repeat MRI brain 2017 unremarkable. She continues to complain of spells of dizziness, stuttering, unilateral weakness, speech changes, gait changes, head pain, and confusion. 6/2018 MRI brain shows 2-3 areas of blooming artifact suspicious for amyloid angiopathy.     I recommend:   1. Advised to see PCP regarding ongoing SOB. Her unmanaged DM could also be contributing to her complaints, including her intermittent blurred vision. She is also pending cataract surgery, which can explain some of her visual complaints.   2. Speak with PCP or Pulmonology regarding CPAP machine, as unmanaged NATY can contribute to memory issues, mood issues, and headaches.   3. Headaches well managed until her visit today. Advised to try taking Fioricet. Some mild left occipital neuralgia, which was discussed today; however, she disagrees with this as a cause of her headaches.   4. Repeat MRI at last visit overall unremarkable for any change. Neuro exam unchanged from prior visit.   5. Suspect some of her complaints to be mood/conversion related, as explained prior. Her long term EEG monitor at home was negative for 2 spells of "feeling weird with body jerk" and multiple somatic symptoms prior.  6. She is now being treated for depression with psychiatry  7. " " PCP  also recently raised her dose of Gabapentin for better headache prevention, which seems to have been helpful; continue at current dose. Avoid using anything to stop a headache (like Fioricet) more than 2 days per week. Advised to stop soft drinks at last visit for better control of diet/DM.   8. Prior MRI brain abnormality is consistent with her cogntive decline and some spells of confusion which all concerning for cerebral amyloid angiopathy. This patient is high risk for the development of dementia/ Alzheimer's disease in the next few years and some of her mood/?behavioral changes may be attributed to this.   9. Continue namenda for dementia (states headaches predate this). Ok to continue ASA (benefits greater than risks given her cardiac history), but would avoid anti-coagulation given amyloid angiopathy.   10. Her neuropathy could be challenging her balance and gait in addition to amyloid angiopathy--continue  Cymbalta as well and completed PT .  She also has some long standing vertigo complaints at times as well   11. Mild neurocognitive disorder by 2016 Neuropsychological testing as well as anxiety depression (mood seems improved on Cymbalta). Consider repeat testing for any worsening.   12. Also continue Metanx and Gabapentin for  Neuropathy. Pamelor caused side effects She failed Lyrica prior. No longer on Elavil and neuropathic cream did not help. The importance of good DM control needed to prevent worsening neuropathy reviewed.    13. Patient has been diagnosed with fibromyalgia (instead of RA).   14. She will continue Mirapex for RLS -She has also had a few years of tremor with possible family history of this--tremor seems most consistent with benign essential tremor - monitor as this could also be related to her neurodegenerative disorder.     RTC 3 months    CC: Dr Springer and Dr Pickens    "note will not be shared with the patient".       "

## 2018-10-18 RX ORDER — TRAZODONE HYDROCHLORIDE 100 MG/1
TABLET ORAL
Qty: 30 TABLET | Refills: 0 | Status: SHIPPED | OUTPATIENT
Start: 2018-10-18 | End: 2018-12-18

## 2018-11-16 RX ORDER — TRAZODONE HYDROCHLORIDE 100 MG/1
TABLET ORAL
Qty: 30 TABLET | Refills: 0 | Status: SHIPPED | OUTPATIENT
Start: 2018-11-16 | End: 2018-12-18

## 2018-12-18 ENCOUNTER — OFFICE VISIT (OUTPATIENT)
Dept: PSYCHIATRY | Facility: CLINIC | Age: 63
End: 2018-12-18
Attending: PSYCHIATRY & NEUROLOGY
Payer: COMMERCIAL

## 2018-12-18 VITALS
RESPIRATION RATE: 16 BRPM | WEIGHT: 283 LBS | HEIGHT: 62 IN | SYSTOLIC BLOOD PRESSURE: 116 MMHG | BODY MASS INDEX: 52.08 KG/M2 | DIASTOLIC BLOOD PRESSURE: 68 MMHG | HEART RATE: 74 BPM

## 2018-12-18 DIAGNOSIS — F33.42 RECURRENT MAJOR DEPRESSIVE DISORDER, IN FULL REMISSION: Primary | ICD-10-CM

## 2018-12-18 DIAGNOSIS — M79.2 NEURALGIA AND NEURITIS: ICD-10-CM

## 2018-12-18 DIAGNOSIS — G62.9 NEUROPATHY: ICD-10-CM

## 2018-12-18 PROCEDURE — 3008F BODY MASS INDEX DOCD: CPT | Mod: CPTII,S$GLB,, | Performed by: PSYCHIATRY & NEUROLOGY

## 2018-12-18 PROCEDURE — 99999 PR PBB SHADOW E&M-EST. PATIENT-LVL III: CPT | Mod: PBBFAC,,, | Performed by: PSYCHIATRY & NEUROLOGY

## 2018-12-18 PROCEDURE — 90833 PSYTX W PT W E/M 30 MIN: CPT | Mod: S$GLB,,, | Performed by: PSYCHIATRY & NEUROLOGY

## 2018-12-18 PROCEDURE — 99213 OFFICE O/P EST LOW 20 MIN: CPT | Mod: S$GLB,,, | Performed by: PSYCHIATRY & NEUROLOGY

## 2018-12-18 RX ORDER — TRAZODONE HYDROCHLORIDE 100 MG/1
200 TABLET ORAL NIGHTLY
Qty: 180 TABLET | Refills: 1 | Status: SHIPPED | OUTPATIENT
Start: 2018-12-18 | End: 2019-04-23 | Stop reason: SDUPTHER

## 2018-12-18 RX ORDER — DULOXETIN HYDROCHLORIDE 60 MG/1
60 CAPSULE, DELAYED RELEASE ORAL DAILY
Qty: 90 CAPSULE | Refills: 1 | Status: SHIPPED | OUTPATIENT
Start: 2018-12-18 | End: 2019-05-02 | Stop reason: SDUPTHER

## 2018-12-18 NOTE — PROGRESS NOTES
"Outpatient Psychiatry Follow-Up Visit (MD/NP)    12/18/2018    Clinical Status of Patient:  Outpatient (Ambulatory)    Chief Complaint:  Pepper Dow is a 63 y.o. female who presents today for follow-up of depression.  Met with patient.      Interval History and Content of Current Session:  Interim Events/Subjective Report/Content of Current Session:     Patients ambulating and her breathing has improved.  Her passive suicidal ideation has improved.  She continues to deny suicidal ideation.  She is frustrated with not being able to drive and constantly having to have someone with her.        Current Medication  Cymbalta 60mg QDay - has helped with her toe pain  Trazodone 100mg QHS - only helps every few nights     Past  Wellbutrin - "made me a zombie in 3 days"  Zoloft   Prozac     Psychosocial   almost 30+ years -3 children and 10 grandchildren    Work  She used to work as a caregiver for handicap persons    Exercise  Limited by her physical disability    Diet  Carbohydrates    12/18/18  She has been to the hospital a couple times since last visit for pneumonia.  She is currently on three liters of oxygen.  She has several chronic lung conditions.  She continues to play games on her ipad.  Trazodone only helps every so often.  She naps throughout the day.      Improved and controlled Symptoms of Depression: less diminished mood or loss of improved interest/anhedonia; improved irritability, improved diminished energy, +change in sleep, change in appetite, improved diminished concentration or cognition or indecisiveness, no PMA/R, no excessive guilt or hopelessness or improved worthlessness, suicidal ideations     Improved Changes in Sleep: trouble with initiation, maintenance, early morning awakening with inability to return to sleep, hypersomnolence      ResolvedSuicidal/Homicidal ideations: active/passive ideations, organized plans, future intentions     No suicidal ideation which is significantly " "improved    No symptoms of anxiety pat or psychosis     Psychotherapy:  · Target symptoms: depression, lifestyle / diet  · Why chosen therapy is appropriate versus another modality: relevant to diagnosis  · Outcome monitoring methods: self-report, observation  · Therapeutic intervention type: behavior modifying psychotherapy, supportive psychotherapy  · Topics discussed/themes: stress related to medical comorbidities, financial stressors  · The patient's response to the intervention is accepting. The patient's progress toward treatment goals is good.   · Duration of intervention: 16 minutes.    Review of Systems     GENERAL: Continues to gain weight  SKIN:  Slight discoloration on her face, pale  HEAD:  no headache  EYES:  No exophthalmos, jaundice or blindness  EARS:  No dizziness, tinnitus or hearing loss  NOSE:  No changes in smell  MOUTH & THROAT:  No dyskinetic movements or obvious goiter  CHEST:  +shortness of breath, cough  CARDIOVASCULAR:  No tachycardia or chest pain  ABDOMEN:  No nausea, vomiting, pain, constipation or diarrhea  URINARY:  No frequency, dysuria or sexual dysfunction  ENDOCRINE:  No polydipsia, polyuria  MUSCULOSKELETAL:  Feels weak in legs  NEUROLOGIC:  +weakness    Past Medical, Family and Social History: The patient's past medical, family and social history have been reviewed and updated as appropriate within the electronic medical record - see encounter notes.    Compliance: yes    Side effects: None    Risk Parameters:  Patient reports no suicidal ideation  Patient reports no homicidal ideation  Patient reports no self-injurious behavior  Patient reports no violent behavior    Exam (detailed: at least 9 elements; comprehensive: all 15 elements)   Constitutional  Vitals:  Most recent vital signs, dated less than 90 days prior to this appointment, were reviewed.   Vitals:    12/18/18 1247   BP: 116/68   Pulse: 74   Resp: 16   Weight: 128.4 kg (283 lb)   Height: 5' 2" (1.575 m)      "   General:  unremarkable, age appropriate     Musculoskeletal  Muscle Strength/Tone:  no spasicity, no rigidity   Gait & Station:  non-ataxic     Psychiatric  Speech:  no latency; no press   Mood & Affect:  happy, euthymic  congruent and appropriate   Thought Process:  normal and logical   Associations:  intact   Thought Content:  normal, no suicidality, no homicidality, delusions, or paranoia   Insight:  intact   Judgement: behavior is adequate to circumstances   Orientation:  grossly intact   Memory: intact for content of interview   Language: grossly intact   Attention Span & Concentration:  able to focus   Fund of Knowledge:  intact and appropriate to age and level of education     Assessment and Diagnosis   Status/Progress: Based on the examination today, the patient's problem(s) is/are improved and well controlled.  New problems have not been presented today.   Co-morbidities are complicating management of the primary condition.  There are no active rule-out diagnoses for this patient at this time.     General Impression:       ICD-10-CM ICD-9-CM   1. Recurrent major depressive disorder, in full remission F33.42 296.36   2. Neuralgia and neuritis M79.2 729.2   3. Neuropathy G62.9 355.9       Intervention/Counseling/Treatment Plan   · Cymbalta 60mg QDay for Depression  · Increase Trazodone 50-100mg QHS  · CMP reviewed from August reviewed, CrCl > 80 cymbalta is ok to prescribe        Checked LA  and no irregularities were noted.    Discussed diagnosis, risks and benefits of proposed treatment vs alternative treatments vs no treatment, and potential side effects of these treatments. The patient expresses understanding of the above and displays the capacity to agree with this treatment given said understanding. Patient also agrees that, currently, the benefits outweigh the risks and would like to pursue treatment at this time.      Return to Clinic: 6 months

## 2019-01-02 ENCOUNTER — OFFICE VISIT (OUTPATIENT)
Dept: NEUROLOGY | Facility: CLINIC | Age: 64
End: 2019-01-02
Payer: COMMERCIAL

## 2019-01-02 VITALS — HEART RATE: 72 BPM | SYSTOLIC BLOOD PRESSURE: 138 MMHG | DIASTOLIC BLOOD PRESSURE: 71 MMHG

## 2019-01-02 DIAGNOSIS — R06.02 SOB (SHORTNESS OF BREATH): ICD-10-CM

## 2019-01-02 DIAGNOSIS — G62.9 NEUROPATHY: ICD-10-CM

## 2019-01-02 DIAGNOSIS — I25.10 CORONARY ARTERY DISEASE, ANGINA PRESENCE UNSPECIFIED, UNSPECIFIED VESSEL OR LESION TYPE, UNSPECIFIED WHETHER NATIVE OR TRANSPLANTED HEART: ICD-10-CM

## 2019-01-02 DIAGNOSIS — G47.33 OSA (OBSTRUCTIVE SLEEP APNEA): ICD-10-CM

## 2019-01-02 DIAGNOSIS — E03.9 HYPOTHYROIDISM, UNSPECIFIED TYPE: ICD-10-CM

## 2019-01-02 DIAGNOSIS — E78.5 HYPERLIPIDEMIA, UNSPECIFIED HYPERLIPIDEMIA TYPE: ICD-10-CM

## 2019-01-02 DIAGNOSIS — J84.10 PULMONARY FIBROSIS: ICD-10-CM

## 2019-01-02 DIAGNOSIS — I10 HYPERTENSION, UNSPECIFIED TYPE: ICD-10-CM

## 2019-01-02 DIAGNOSIS — E11.65 UNCONTROLLED TYPE 2 DIABETES MELLITUS WITH HYPERGLYCEMIA: ICD-10-CM

## 2019-01-02 DIAGNOSIS — R51.9 HEADACHE, UNSPECIFIED HEADACHE TYPE: Primary | ICD-10-CM

## 2019-01-02 DIAGNOSIS — G25.81 RLS (RESTLESS LEGS SYNDROME): ICD-10-CM

## 2019-01-02 PROCEDURE — 99999 PR PBB SHADOW E&M-EST. PATIENT-LVL IV: ICD-10-PCS | Mod: PBBFAC,,, | Performed by: NURSE PRACTITIONER

## 2019-01-02 PROCEDURE — 99214 OFFICE O/P EST MOD 30 MIN: CPT | Mod: S$GLB,,, | Performed by: NURSE PRACTITIONER

## 2019-01-02 PROCEDURE — 3075F PR MOST RECENT SYSTOLIC BLOOD PRESS GE 130-139MM HG: ICD-10-PCS | Mod: CPTII,S$GLB,, | Performed by: NURSE PRACTITIONER

## 2019-01-02 PROCEDURE — 3075F SYST BP GE 130 - 139MM HG: CPT | Mod: CPTII,S$GLB,, | Performed by: NURSE PRACTITIONER

## 2019-01-02 PROCEDURE — 3078F DIAST BP <80 MM HG: CPT | Mod: CPTII,S$GLB,, | Performed by: NURSE PRACTITIONER

## 2019-01-02 PROCEDURE — 3078F PR MOST RECENT DIASTOLIC BLOOD PRESSURE < 80 MM HG: ICD-10-PCS | Mod: CPTII,S$GLB,, | Performed by: NURSE PRACTITIONER

## 2019-01-02 PROCEDURE — 99214 PR OFFICE/OUTPT VISIT, EST, LEVL IV, 30-39 MIN: ICD-10-PCS | Mod: S$GLB,,, | Performed by: NURSE PRACTITIONER

## 2019-01-02 PROCEDURE — 99999 PR PBB SHADOW E&M-EST. PATIENT-LVL IV: CPT | Mod: PBBFAC,,, | Performed by: NURSE PRACTITIONER

## 2019-01-02 NOTE — PROGRESS NOTES
HPI: Pepper Dow is a 63 y.o. female with DM,  lower back pain (focal) likely from  Annular tear at L5/S1 in addition to multiple level degenerative change of the L spine per 2012 MRI. 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 Patient had  spells of Shortness of breath, confusion and dizziness  In 2015 with documented O2 reduction-  NSIP per pulmonology at that time. MRI brain 2015 unremarkable and no epileptiform discharges on EEG at that time. She has CAD, CHF, pulmonary fibrosis, DM Type II, HTN, HLD, PAD, neuropathy, thyroid disease, and history of psychiatric hospitalization.     She presents today for a follow up visit. Her headaches are much improved since her last visit.     She has been hospitalized twice for pneumonia since her last visit. She continues with SOB. Dizziness is resolved.     She is mostly sedentary at this point, due to her pulmonary issues. She ambulates to the restroom during the day with her walker.     She is s/p bilateral cataract surgery with [facility].     She continues with memory issues, as well as depression. She continues to see Psychiatry. She sees Dr. Pickens.     She obtained a trilogy machine for her NATY.     ROS unreliable, due to florid positivity  Review of Systems   Unable to perform ROS: Other     Exam:  Gen Appearance, well developed/nourished in no apparent distress  CV: 2+ distal pulses with mild dependent edema or swelling  Neuro:  MS: Awake, alert, Sustains attention and oriented times 4 today Recent/remote memory intact, Language is full to spontaneous speech/comprehension. Fund of Knowledge is full; no speech hesitancy today.   CN: PERRL, Extraoccular movements and visual fields are full. Normal facial sensation and strength, Hearing symmetric, Tongue and Palate are midline and strong. Shoulder Shrug symmetric and strong.  Motor: Normal bulk, tone reduced in the intrinsic hand and  "feet muscles, no abnormal movements at rest but there is mild tremor with outstretched hands. 5/5 strength bilateral upper/lower extremities except that she refuses to use the left leg, with 2+ reflexes in the arms and 1+ in the knees, absent ankle jerks.   Sensory: unreliable, Romberg not done   Cerebellar:unreliable  Gait: ambulates with walker; no atasia/abasia today    Labs:  3/2018:  CMP, CBC, TSh, B12 stable  2018 random blood glucose of  281    Imagin2018 MRI Brain LOS:   Unchanged from prior study.     2018 MRI brain: 2-3 areas of bilateral areas of micro hemorrhages concerning for cerebral amyloid angiography, new from prior.   2017 MRI Brain: Age-appropriate generalized volume loss with scattered foci of T2/FLAIR signal abnormality within the supratentorial white matter  while nonspecific suggestive for mild degree of degree of  chronic microvascular ischemic change.    No evidence for acute infarction or enhancing lesion.    Empty sella turcica configuration.    3/2017 Degenerative changes in the shoulder and neck by xray     EEG: Clinical Impression:  Abnormal EEG secondary to the presence of   mild generalized slowing which is etiologically non-specific.     2018 EEG ambulatory monitor: events of "feeling weird and body jerk" did not have an EEG correlate.     Assessment/Plan: Pepper Dow is a 63 y.o. female  with DM,  lower back pain (focal) likely from  Annular tear at L5/S1 in addition to multiple level degenerative change of the L spine per 2012 MRI. 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 Patient had  spells of Shortness of breath, confusion and dizziness  In  with documented O2 reduction-  NSIP per pulmonology at that time. MRI brain 2015 unremarkable and no epileptiform discharges on EEG at that time. 2016 Neuropsych testing suggests mild neurocognitive disorder. Repeat MRI brain 2017 " "unremarkable. She continues to complain of spells of dizziness, stuttering, unilateral weakness, speech changes, gait changes, head pain, and confusion. 6/2018 MRI brain shows 2-3 areas of blooming artifact suspicious for amyloid angiopathy.     I recommend:   1. Headaches improved at this time. There was an occipital neuralgia component to her headaches at her last visit.   2. Encouraged ongoing compliance with CPAP to reduce headaches, risk of vascular events, and improve cognitive issues.   3. Ongoing memory loss likely related to both pulmonary/oxygenation issues and her depression. She is followed by Psychiatry.   4. Repeat MRI at prior visit with complaint of headaches, dizziness, and cognitive complaints overall unremarkable for any change. Prior MRI brain abnormality is consistent with her cogntive decline and some spells of confusion which all concerning for cerebral amyloid angiopathy. This patient is high risk for the development of dementia/ Alzheimer's disease in the next few years and some of her mood/?behavioral changes may be attributed to this.   5. Suspect some of her complaints to be mood/conversion related, as explained prior. Her long term EEG monitor at home was negative for 2 spells of "feeling weird with body jerk" and multiple somatic symptoms prior.  6. She is now being treated for depression with psychiatry, which she should continue.   7. Continue Gabapentin per PCP for headache prevention, which seems to have been helpful; continue at current dose. Avoid using anything to stop a headache (like Fioricet) more than 2 days per week. Advised to stop soft drinks at last visit for better control of diet/DM.   8. Continue namenda for dementia. Ok to continue ASA (benefits greater than risks given her cardiac history), but would avoid anti-coagulation given amyloid angiopathy.   9. Her neuropathy could be challenging her balance and gait in addition to amyloid angiopathy--continue  Cymbalta as " "well and completed PT .  She also has some long standing vertigo complaints at times as well   11. Mild neurocognitive disorder by 2016 Neuropsychological testing as well as anxiety depression (mood seems improved on Cymbalta). Consider repeat testing for any worsening.   12. Also continue Metanx and Gabapentin for  Neuropathy. Pamelor caused side effects She failed Lyrica prior. No longer on Elavil and neuropathic cream did not help. The importance of good DM control needed to prevent worsening neuropathy reviewed.    13. Patient has been diagnosed with fibromyalgia (instead of RA).   14. She will continue Mirapex for RLS -She has also had a few years of tremor with possible family history of this--tremor seems most consistent with benign essential tremor - monitor as this could also be related to her neurodegenerative disorder.     RTC 6 months    CC: Dr Springer and Dr Pickens    "note will not be shared with the patient".       "

## 2019-04-09 ENCOUNTER — OFFICE VISIT (OUTPATIENT)
Dept: NEUROLOGY | Facility: CLINIC | Age: 64
End: 2019-04-09
Payer: COMMERCIAL

## 2019-04-09 VITALS
HEART RATE: 81 BPM | SYSTOLIC BLOOD PRESSURE: 118 MMHG | WEIGHT: 263 LBS | HEIGHT: 62 IN | RESPIRATION RATE: 22 BRPM | DIASTOLIC BLOOD PRESSURE: 62 MMHG | BODY MASS INDEX: 48.4 KG/M2

## 2019-04-09 DIAGNOSIS — J84.10 PULMONARY FIBROSIS: ICD-10-CM

## 2019-04-09 DIAGNOSIS — I68.0 CEREBRAL AMYLOID ANGIOPATHY: Primary | ICD-10-CM

## 2019-04-09 DIAGNOSIS — E85.4 CEREBRAL AMYLOID ANGIOPATHY: Primary | ICD-10-CM

## 2019-04-09 DIAGNOSIS — G25.81 RLS (RESTLESS LEGS SYNDROME): ICD-10-CM

## 2019-04-09 DIAGNOSIS — G62.9 NEUROPATHY: ICD-10-CM

## 2019-04-09 PROCEDURE — 3008F BODY MASS INDEX DOCD: CPT | Mod: CPTII,S$GLB,, | Performed by: PSYCHIATRY & NEUROLOGY

## 2019-04-09 PROCEDURE — 3074F PR MOST RECENT SYSTOLIC BLOOD PRESSURE < 130 MM HG: ICD-10-PCS | Mod: CPTII,S$GLB,, | Performed by: PSYCHIATRY & NEUROLOGY

## 2019-04-09 PROCEDURE — 3074F SYST BP LT 130 MM HG: CPT | Mod: CPTII,S$GLB,, | Performed by: PSYCHIATRY & NEUROLOGY

## 2019-04-09 PROCEDURE — 3078F DIAST BP <80 MM HG: CPT | Mod: CPTII,S$GLB,, | Performed by: PSYCHIATRY & NEUROLOGY

## 2019-04-09 PROCEDURE — 99214 OFFICE O/P EST MOD 30 MIN: CPT | Mod: S$GLB,,, | Performed by: PSYCHIATRY & NEUROLOGY

## 2019-04-09 PROCEDURE — 99214 PR OFFICE/OUTPT VISIT, EST, LEVL IV, 30-39 MIN: ICD-10-PCS | Mod: S$GLB,,, | Performed by: PSYCHIATRY & NEUROLOGY

## 2019-04-09 PROCEDURE — 99999 PR PBB SHADOW E&M-EST. PATIENT-LVL III: ICD-10-PCS | Mod: PBBFAC,,, | Performed by: PSYCHIATRY & NEUROLOGY

## 2019-04-09 PROCEDURE — 3078F PR MOST RECENT DIASTOLIC BLOOD PRESSURE < 80 MM HG: ICD-10-PCS | Mod: CPTII,S$GLB,, | Performed by: PSYCHIATRY & NEUROLOGY

## 2019-04-09 PROCEDURE — 99999 PR PBB SHADOW E&M-EST. PATIENT-LVL III: CPT | Mod: PBBFAC,,, | Performed by: PSYCHIATRY & NEUROLOGY

## 2019-04-09 PROCEDURE — 3008F PR BODY MASS INDEX (BMI) DOCUMENTED: ICD-10-PCS | Mod: CPTII,S$GLB,, | Performed by: PSYCHIATRY & NEUROLOGY

## 2019-04-09 RX ORDER — INSULIN PUMP SYRINGE, 3 ML
EACH MISCELLANEOUS
COMMUNITY

## 2019-04-09 RX ORDER — GABAPENTIN 400 MG/1
CAPSULE ORAL
COMMUNITY
End: 2019-04-09

## 2019-04-09 RX ORDER — BLOOD-GLUCOSE CONTROL, NORMAL
EACH MISCELLANEOUS
COMMUNITY

## 2019-04-09 NOTE — PROGRESS NOTES
HPI: Pepper Dow is a 63 y.o. female  with DM,  lower back pain (focal) likely from  Annular tear at L5/S1 in addition to multiple level degenerative change of the L spine per 2012 MRI. 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 MRI brain 2015 unremarkable and no epileptiform discharges on EEG at that time.   2016 Neuropsych testing suggests mild neurocognitive disorder. Repeat MRI brain 2017 unremarkable  She continues to complain of spells of dizziness, stuttering and confusion.   6/2018 MRI brain shows 2-3 areas of blooming artifact suspicious for amyloid angiopathy       The patient last saw me in 8/2018. She followed up with BRITT Tejada after that point.   Her MRI brain in 8/2018 showed no new changes to explain her report of left sided weakness    She has been in the hospital 3  times since the last visit with me for her pulmonary issues. She last saw him in February and has a pending follow up. Her O2 Requirement increased over time.  Her memory has been ok at times but challenged at other time.  She continues with her same prior jerking as she had prior without EEG correlation.  She is following with Dr Zafar.   Headaches are nearly resolved currently- she was found to have some CO2 retention which is improved.   She continues with her burning in her feet which she treats with metanx and gabapentin  RLS is stable  She continues with vertigo in episodes     Review of Systems   Constitutional: Negative for fever.   HENT: Negative for nosebleeds.    Eyes: Negative for double vision.   Respiratory: Negative for hemoptysis.    Cardiovascular: Negative for leg swelling.   Gastrointestinal: Negative for blood in stool.   Genitourinary: Negative for hematuria.   Musculoskeletal: Negative  for falls.   Skin: Negative for rash.   Neurological: Positive for speech change and headaches. Negative for tingling, focal weakness, seizures and loss of consciousness.   Endo/Heme/Allergies: Does not bruise/bleed easily.   Psychiatric/Behavioral: The patient has insomnia.        Exam:  Gen Appearance, well developed/nourished in no apparent distress  CV: 2+ distal pulses with mild dependent edema or swelling  Neuro:  MS: Awake, alert, Sustains attention and oriented times 4 today Recent/remote memory intact, Language is full to spontaneous speech/comprehension. Fund of Knowledge is full  Some speech hesitancy which is clearly improved with distraction again today  CN: , PERRL, Extraoccular movements and visual fields are full. Normal facial sensation and strength, Hearing symmetric, Tongue and Palate are midline and strong. Shoulder Shrug symmetric and strong.  Motor: Normal bulk, tone reduced in the intrinsic hand and feet muscles, no abnormal movements at rest but there is mild tremor with outstretched hands. 5/5 strength bilateral upper/lower extremities except that she refuses to use the left leg, with 2+ reflexes in the arms and 1+ in the knees, absent ankle jerks.   Sensory: unreliable, Romberg not done   Cerebellar:unreliable  Gait: Not done -in wheelchair but uses walker. Atasia abasia noted prior.     2018:  CMP, CBC, TSh, B12 stable    4/2017 MRI Brain: Age-appropriate generalized volume loss with scattered foci of T2/FLAIR signal abnormality within the supratentorial white matter  while nonspecific suggestive for mild degree of degree of  chronic microvascular ischemic change.    No evidence for acute infarction or enhancing lesion.    Empty sella turcica configuration.    3/2017 Degenerative changes in the shoulder and neck by xray    2015 EEG: Clinical Impression:  Abnormal EEG secondary to the presence of   mild generalized slowing which is etiologically non-specific.     5/2018 EEG ambulatory  "monitor: events of "feeling weird and body jerk" did not have an EEG correlate.     7/2018 MRI brain: 2-3 areas of bilateral areas of micro hemorrhages concerning for cerebral amyloid angiography, new from prior.     Assessment/Plan: Pepper Dow is a 63 y.o. female  with DM,  lower back pain (focal) likely from  Annular tear at L5/S1 in addition to multiple level degenerative change of the L spine per 2012 MRI. 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 MRI brain 2015 unremarkable and no epileptiform discharges on EEG at that time.   2016 Neuropsych testing suggests mild neurocognitive disorder. Repeat MRI brain 2017 unremarkable  She continues to complain of spells of dizziness, stuttering and confusion.   6/2018 MRI brain shows 2-3 areas of blooming artifact suspicious for amyloid angiopathy. Repeat study in 8/2018 showed no further changes    I recommend:     1. Regarding her likely Cerebral Amyloid Angiopathy: Updated MRI brain 8/2018 showed no changes to explain her prolonged symptoms of weakness, although she does have an cerebral amyloid history  -Some of her prolonged symptoms are related to mood/conversion as stated prior given her long term EEG monitor at home was negative for 2 spells of "feeling weird with body jerk" and multiple somatic symptoms prior  -However, this patient is high risk for the development of dementia/ Alzheimer's disease in the next few years and some of her mood/?behavioral changes may be attributed to this.  -Continue namenda for dementia. Ok to continue ASA (benefits greater than risks given her cardiac history), but would avoid anti-coagulation given amyloid angiopathy.   - Mild neurocognitive disorder by 2016 Neuropsychological testing as well as anxiety " depression (mood seems improved on Cymbalta). Consider repeat testing for any worsening.   2.  Headaches improved at this time. There was an occipital neuralgia component to her headaches at a prior visit.   -Continue Gabapentin per PCP for headache prevention, which seems to have been helpful; continue at current dose. Avoid using anything to stop a headache (like Fioricet) more than 2 days per week. Advised to stop soft drinks at last visit for better control of diet/DM.   3.  Ongoing memory loss likely related to cerebral amyloid angiopathy,  pulmonary/oxygenation issues and her depression. She is followed by Psychiatry.   4. She is now on a home trilogy machine now.  She reports pulmonary fibrosis and is now back on O2.  5.  Her neuropathy could be challenging her balance and gait in addition to amyloid angiopathy--continue  Cymbalta as well and completed PT .  She also has some long standing vertigo complaints at times as well   -also continue Metanx and Gabapentin for  Neuropathy. Pamelor caused side effects She failed Lyrica prior. No longer on Elavil and neuropathic cream did not help. -the importance of good DM control needed to prevent worsening neuropathy reviewed.    6. Patient has been diagnosed with fibromyalgia (instead of RA).   7. She will continue Mirapex for RLS -She has also had a few years of tremor with possible family history of this--tremor seems most consistent with benign essential tremor - monitor as this could also be related to her neurodegenerative disorder.        RTC 6 months

## 2019-04-24 RX ORDER — TRAZODONE HYDROCHLORIDE 100 MG/1
TABLET ORAL
Qty: 180 TABLET | Refills: 1 | Status: SHIPPED | OUTPATIENT
Start: 2019-04-24 | End: 2019-05-02 | Stop reason: SDUPTHER

## 2019-05-02 ENCOUNTER — OFFICE VISIT (OUTPATIENT)
Dept: PSYCHIATRY | Facility: CLINIC | Age: 64
End: 2019-05-02
Attending: PSYCHIATRY & NEUROLOGY
Payer: COMMERCIAL

## 2019-05-02 VITALS
BODY MASS INDEX: 48.15 KG/M2 | HEART RATE: 84 BPM | RESPIRATION RATE: 19 BRPM | SYSTOLIC BLOOD PRESSURE: 126 MMHG | DIASTOLIC BLOOD PRESSURE: 82 MMHG | HEIGHT: 61 IN | WEIGHT: 255 LBS

## 2019-05-02 DIAGNOSIS — G47.00 INSOMNIA, UNSPECIFIED TYPE: ICD-10-CM

## 2019-05-02 DIAGNOSIS — F33.1 MODERATE EPISODE OF RECURRENT MAJOR DEPRESSIVE DISORDER: Primary | ICD-10-CM

## 2019-05-02 DIAGNOSIS — M79.2 NEURALGIA AND NEURITIS: ICD-10-CM

## 2019-05-02 DIAGNOSIS — G31.84 MILD COGNITIVE IMPAIRMENT WITH MEMORY LOSS: ICD-10-CM

## 2019-05-02 DIAGNOSIS — G62.9 NEUROPATHY: ICD-10-CM

## 2019-05-02 PROCEDURE — 3008F PR BODY MASS INDEX (BMI) DOCUMENTED: ICD-10-PCS | Mod: CPTII,S$GLB,, | Performed by: PSYCHIATRY & NEUROLOGY

## 2019-05-02 PROCEDURE — 90833 PSYTX W PT W E/M 30 MIN: CPT | Mod: S$GLB,,, | Performed by: PSYCHIATRY & NEUROLOGY

## 2019-05-02 PROCEDURE — 99213 PR OFFICE/OUTPT VISIT, EST, LEVL III, 20-29 MIN: ICD-10-PCS | Mod: S$GLB,,, | Performed by: PSYCHIATRY & NEUROLOGY

## 2019-05-02 PROCEDURE — 99999 PR PBB SHADOW E&M-EST. PATIENT-LVL IV: CPT | Mod: PBBFAC,,, | Performed by: PSYCHIATRY & NEUROLOGY

## 2019-05-02 PROCEDURE — 3079F DIAST BP 80-89 MM HG: CPT | Mod: CPTII,S$GLB,, | Performed by: PSYCHIATRY & NEUROLOGY

## 2019-05-02 PROCEDURE — 90833 PR PSYCHOTHERAPY W/PATIENT W/E&M, 30 MIN (ADD ON): ICD-10-PCS | Mod: S$GLB,,, | Performed by: PSYCHIATRY & NEUROLOGY

## 2019-05-02 PROCEDURE — 99999 PR PBB SHADOW E&M-EST. PATIENT-LVL IV: ICD-10-PCS | Mod: PBBFAC,,, | Performed by: PSYCHIATRY & NEUROLOGY

## 2019-05-02 PROCEDURE — 3074F SYST BP LT 130 MM HG: CPT | Mod: CPTII,S$GLB,, | Performed by: PSYCHIATRY & NEUROLOGY

## 2019-05-02 PROCEDURE — 3008F BODY MASS INDEX DOCD: CPT | Mod: CPTII,S$GLB,, | Performed by: PSYCHIATRY & NEUROLOGY

## 2019-05-02 PROCEDURE — 3074F PR MOST RECENT SYSTOLIC BLOOD PRESSURE < 130 MM HG: ICD-10-PCS | Mod: CPTII,S$GLB,, | Performed by: PSYCHIATRY & NEUROLOGY

## 2019-05-02 PROCEDURE — 3079F PR MOST RECENT DIASTOLIC BLOOD PRESSURE 80-89 MM HG: ICD-10-PCS | Mod: CPTII,S$GLB,, | Performed by: PSYCHIATRY & NEUROLOGY

## 2019-05-02 PROCEDURE — 99213 OFFICE O/P EST LOW 20 MIN: CPT | Mod: S$GLB,,, | Performed by: PSYCHIATRY & NEUROLOGY

## 2019-05-02 RX ORDER — SPIRONOLACTONE 25 MG/1
25 TABLET ORAL DAILY
COMMUNITY

## 2019-05-02 RX ORDER — TRAZODONE HYDROCHLORIDE 100 MG/1
200 TABLET ORAL NIGHTLY
Qty: 180 TABLET | Refills: 1 | Status: SHIPPED | OUTPATIENT
Start: 2019-05-02

## 2019-05-02 RX ORDER — TRAMADOL HYDROCHLORIDE 50 MG/1
50 TABLET ORAL EVERY 8 HOURS PRN
COMMUNITY

## 2019-05-02 RX ORDER — CLOTRIMAZOLE 1 %
CREAM (GRAM) TOPICAL
COMMUNITY

## 2019-05-02 RX ORDER — HYDROCODONE POLISTIREX AND CHLORPHENIRAMINE POLISTIREX 10; 8 MG/5ML; MG/5ML
SUSPENSION, EXTENDED RELEASE ORAL
COMMUNITY

## 2019-05-02 RX ORDER — BENZONATATE 200 MG/1
200 CAPSULE ORAL DAILY
Refills: 5 | COMMUNITY
Start: 2019-04-22

## 2019-05-02 RX ORDER — DULOXETIN HYDROCHLORIDE 60 MG/1
60 CAPSULE, DELAYED RELEASE ORAL DAILY
Qty: 90 CAPSULE | Refills: 1 | Status: SHIPPED | OUTPATIENT
Start: 2019-05-02

## 2019-05-02 RX ORDER — IPRATROPIUM BROMIDE 0.5 MG/2.5ML
SOLUTION RESPIRATORY (INHALATION)
Refills: 5 | COMMUNITY
Start: 2019-04-01

## 2019-05-02 NOTE — PROGRESS NOTES
"Outpatient Psychiatry Follow-Up Visit (MD/NP)    5/2/2019    Clinical Status of Patient:  Outpatient (Ambulatory)    Chief Complaint:  Pepper Dow is a 63 y.o. female who presents today for follow-up of depression.  Met with patient.      Interval History and Content of Current Session:  Interim Events/Subjective Report/Content of Current Session:     Current Medication  Cymbalta 60mg QDay - has helped with her toe pain  Trazodone 200mg QHS - only helps every few nights     Past  Wellbutrin - "made me a zombie in 3 days"  Zoloft   Prozac     Psychosocial   almost 30+ years -3 children and 10 grandchildren    Work  She used to work as a caregiver for handicap persons    Exercise  Limited by her physical disability    Diet  Carbohydrates    12/18/18  She has been to the hospital a couple times since last visit for pneumonia.  She is currently on three liters of oxygen.  She has several chronic lung conditions.  She continues to play games on her ipad.  Trazodone only helps every so often.  She naps throughout the day.      5/2  Patient continues to have significant breathing problems.  Her medical illnesses have worsened her mood.  She is tired fo fighting her breathing problems.  She is not suicidal because it is against her [employer].  She is able to find happiness in being with her family.  She increased her trazodone to 200mg at night.      Worsening Symptoms of Depression: + diminished mood or loss of improved interest/anhedonia; improved irritability, improved diminished energy, +change in sleep, change in appetite, improved diminished concentration or cognition or indecisiveness, no PMA/R, no excessive guilt or hopelessness or improved worthlessness, suicidal ideations    Worsened by circumstance of health issues     Improved Changes in Sleep: trouble with initiation, maintenance, early morning awakening with inability to return to sleep, hypersomnolence      No Suicidal/Homicidal ideations: " active/passive ideations, organized plans, future intentions     No suicidal ideation which is significantly improved    No symptoms of anxiety pat or psychosis     Psychotherapy:  · Target symptoms: depression,    · Why chosen therapy is appropriate versus another modality: relevant to diagnosis  · Outcome monitoring methods: self-report, observation  · Therapeutic intervention type: behavior modifying psychotherapy, supportive psychotherapy  · Topics discussed/themes: stress related to medical comorbidities, financial stressors  · The patient's response to the intervention is accepting. The patient's progress toward treatment goals is good.   · Duration of intervention: 16 minutes.    Review of Systems     GENERAL: Continues to gain weight  SKIN:  Yeast infection from frequent Abx use  HEAD:  no headache  EYES:  No exophthalmos, jaundice or blindness  EARS:  No dizziness, tinnitus or hearing loss  NOSE:  No changes in smell  MOUTH & THROAT:  No dyskinetic movements or obvious goiter  CHEST:  +shortness of breath, cough  CARDIOVASCULAR:  No tachycardia or chest pain  ABDOMEN:  Recent GI blood loss  URINARY:  No frequency, dysuria or sexual dysfunction  ENDOCRINE:  No polydipsia, polyuria  MUSCULOSKELETAL:  Feels weak in legs  NEUROLOGIC:  +weakness    Past Medical, Family and Social History: The patient's past medical, family and social history have been reviewed and updated as appropriate within the electronic medical record - see encounter notes.    Compliance: yes    Side effects: None    Risk Parameters:  Patient reports no suicidal ideation  Patient reports no homicidal ideation  Patient reports no self-injurious behavior  Patient reports no violent behavior    Exam (detailed: at least 9 elements; comprehensive: all 15 elements)   Constitutional  Vitals:  Most recent vital signs, dated less than 90 days prior to this appointment, were reviewed.   Vitals:    05/02/19 1022   BP: 126/82   Pulse: 84   Resp: 19  "  Weight: 115.7 kg (255 lb)   Height: 5' 1" (1.549 m)        General:  unremarkable, age appropriate     Musculoskeletal  Muscle Strength/Tone:  no spasicity, no rigidity   Gait & Station:  non-ataxic     Psychiatric  Speech:  no latency; no press   Mood & Affect:  depressed  congruent and appropriate   Thought Process:  normal and logical   Associations:  intact   Thought Content:  normal, no suicidality, no homicidality, delusions, or paranoia   Insight:  intact   Judgement: behavior is adequate to circumstances   Orientation:  grossly intact   Memory: intact for content of interview   Language: grossly intact   Attention Span & Concentration:  able to focus   Fund of Knowledge:  intact and appropriate to age and level of education     Assessment and Diagnosis   Status/Progress: Based on the examination today, the patient's problem(s) is/are worsening.  New problems have been presented today.   Co-morbidities are complicating management of the primary condition.  There are no active rule-out diagnoses for this patient at this time.     General Impression:       ICD-10-CM ICD-9-CM   1. Moderate episode of recurrent major depressive disorder F33.1 296.32   2. Insomnia, unspecified type G47.00 780.52   3. Mild cognitive impairment with memory loss G31.84 331.83     780.93       Intervention/Counseling/Treatment Plan   Depression  Continue Cymbalta 60mg QDay    Insomnia  Continue Trazodone 100mg QHS    Cognitive Impairment  Reviewed neurology notes      Checked LA  and no irregularities were noted.    Discussed diagnosis, risks and benefits of proposed treatment vs alternative treatments vs no treatment, and potential side effects of these treatments. The patient expresses understanding of the above and displays the capacity to agree with this treatment given said understanding. Patient also agrees that, currently, the benefits outweigh the risks and would like to pursue treatment at this time.      Return to " Clinic: 6 months

## 2019-05-13 RX ORDER — MEMANTINE HYDROCHLORIDE 5 MG/1
5 TABLET ORAL 2 TIMES DAILY
Qty: 180 TABLET | Refills: 3 | Status: SHIPPED | OUTPATIENT
Start: 2019-05-13

## 2019-08-06 ENCOUNTER — TELEPHONE (OUTPATIENT)
Dept: NEUROLOGY | Facility: CLINIC | Age: 64
End: 2019-08-06

## 2019-08-06 NOTE — TELEPHONE ENCOUNTER
----- Message from Sunshine Ernst sent at 2019 11:56 AM CDT -----  Contact: Jose- Daughter  Pepper Dow  MRN: [id number]  : 1955  PCP: Ricardo Springer  Home Phone      [phone]  Work Phone      Not on file.  Mobile          [phone]      MESSAGE:   Daughter called to inform Doctor that the patient passed away yesterday.     Phone: [phone]